# Patient Record
Sex: FEMALE | Race: WHITE | Employment: OTHER | URBAN - METROPOLITAN AREA
[De-identification: names, ages, dates, MRNs, and addresses within clinical notes are randomized per-mention and may not be internally consistent; named-entity substitution may affect disease eponyms.]

---

## 2016-01-21 LAB — PAP SMEAR: NORMAL

## 2017-05-15 ENCOUNTER — TELEPHONE (OUTPATIENT)
Dept: INTERNAL MEDICINE | Age: 62
End: 2017-05-15

## 2017-05-24 ENCOUNTER — HOSPITAL ENCOUNTER (OUTPATIENT)
Dept: MAMMOGRAPHY | Age: 62
Discharge: HOME | End: 2017-05-24
Attending: OBSTETRICS & GYNECOLOGY | Admitting: OBSTETRICS & GYNECOLOGY

## 2017-05-24 DIAGNOSIS — Z12.31 VISIT FOR SCREENING MAMMOGRAM: ICD-10-CM

## 2017-06-21 ENCOUNTER — TELEPHONE (OUTPATIENT)
Dept: INTERNAL MEDICINE | Age: 62
End: 2017-06-21

## 2017-06-21 RX ORDER — SERTRALINE HYDROCHLORIDE 100 MG/1
100 TABLET, FILM COATED ORAL DAILY
Qty: 30 TABLET | Refills: 12 | Status: SHIPPED | OUTPATIENT
Start: 2017-06-21 | End: 2018-07-07 | Stop reason: SDUPTHER

## 2017-07-19 ENCOUNTER — OFFICE VISIT (OUTPATIENT)
Dept: INTERNAL MEDICINE | Age: 62
End: 2017-07-19

## 2017-07-19 VITALS
HEIGHT: 64 IN | HEART RATE: 70 BPM | BODY MASS INDEX: 21.34 KG/M2 | DIASTOLIC BLOOD PRESSURE: 70 MMHG | WEIGHT: 125 LBS | SYSTOLIC BLOOD PRESSURE: 110 MMHG

## 2017-07-19 DIAGNOSIS — G47.419 PRIMARY NARCOLEPSY WITHOUT CATAPLEXY: ICD-10-CM

## 2017-07-19 DIAGNOSIS — Z12.11 SPECIAL SCREENING FOR MALIGNANT NEOPLASMS, COLON: ICD-10-CM

## 2017-07-19 DIAGNOSIS — Z00.00 PERIODIC HEALTH ASSESSMENT, GENERAL SCREENING, ADULT: ICD-10-CM

## 2017-07-19 DIAGNOSIS — E78.5 HYPERLIPIDEMIA, UNSPECIFIED HYPERLIPIDEMIA TYPE: ICD-10-CM

## 2017-07-19 DIAGNOSIS — Z00.00 ROUTINE GENERAL MEDICAL EXAMINATION AT A HEALTH CARE FACILITY: Primary | ICD-10-CM

## 2017-07-19 LAB
BILIRUBIN, POC: NORMAL
BLOOD URINE, POC: NORMAL
CLARITY, POC: CLEAR
COLOR, POC: YELLOW
GLUCOSE URINE, POC: NORMAL
KETONES, POC: NORMAL
LEUKOCYTE EST, POC: NORMAL
NITRITE, POC: NORMAL
PH, POC: 5
PROTEIN, POC: NORMAL
SPECIFIC GRAVITY, POC: 1
UROBILINOGEN, POC: NORMAL

## 2017-07-19 PROCEDURE — 81002 URINALYSIS NONAUTO W/O SCOPE: CPT | Performed by: INTERNAL MEDICINE

## 2017-07-19 PROCEDURE — 99396 PREV VISIT EST AGE 40-64: CPT | Performed by: INTERNAL MEDICINE

## 2017-07-19 PROCEDURE — 93000 ELECTROCARDIOGRAM COMPLETE: CPT | Performed by: INTERNAL MEDICINE

## 2017-07-19 RX ORDER — METHYLPHENIDATE HYDROCHLORIDE 20 MG/1
20 CAPSULE, EXTENDED RELEASE ORAL EVERY MORNING
COMMUNITY

## 2017-07-19 RX ORDER — MODAFINIL 100 MG/1
150 TABLET ORAL DAILY
COMMUNITY

## 2017-07-19 ASSESSMENT — PATIENT HEALTH QUESTIONNAIRE - PHQ9
SUM OF ALL RESPONSES TO PHQ QUESTIONS 1-9: 0
2. FEELING DOWN, DEPRESSED OR HOPELESS: 0
1. LITTLE INTEREST OR PLEASURE IN DOING THINGS: 0
SUM OF ALL RESPONSES TO PHQ9 QUESTIONS 1 & 2: 0

## 2017-07-20 DIAGNOSIS — E78.5 HYPERLIPIDEMIA, UNSPECIFIED HYPERLIPIDEMIA TYPE: ICD-10-CM

## 2017-07-20 DIAGNOSIS — Z00.00 PERIODIC HEALTH ASSESSMENT, GENERAL SCREENING, ADULT: ICD-10-CM

## 2017-07-20 DIAGNOSIS — Z00.00 ROUTINE GENERAL MEDICAL EXAMINATION AT A HEALTH CARE FACILITY: ICD-10-CM

## 2017-07-20 LAB
A/G RATIO: 2.2 (ref 1.1–2.2)
ALBUMIN SERPL-MCNC: 4.6 G/DL (ref 3.4–5)
ALP BLD-CCNC: 78 U/L (ref 40–129)
ALT SERPL-CCNC: 11 U/L (ref 10–40)
ANION GAP SERPL CALCULATED.3IONS-SCNC: 14 MMOL/L (ref 3–16)
AST SERPL-CCNC: 14 U/L (ref 15–37)
BASOPHILS ABSOLUTE: 0 K/UL (ref 0–0.2)
BASOPHILS RELATIVE PERCENT: 0.7 %
BILIRUB SERPL-MCNC: 0.5 MG/DL (ref 0–1)
BUN BLDV-MCNC: 10 MG/DL (ref 7–20)
CALCIUM SERPL-MCNC: 9.4 MG/DL (ref 8.3–10.6)
CHLORIDE BLD-SCNC: 104 MMOL/L (ref 99–110)
CHOLESTEROL, TOTAL: 222 MG/DL (ref 0–199)
CO2: 26 MMOL/L (ref 21–32)
CREAT SERPL-MCNC: 0.6 MG/DL (ref 0.6–1.1)
EOSINOPHILS ABSOLUTE: 0.1 K/UL (ref 0–0.6)
EOSINOPHILS RELATIVE PERCENT: 1.9 %
GFR AFRICAN AMERICAN: >60
GFR NON-AFRICAN AMERICAN: >60
GLOBULIN: 2.1 G/DL
GLUCOSE BLD-MCNC: 85 MG/DL (ref 70–99)
HCT VFR BLD CALC: 41.4 % (ref 36–48)
HDLC SERPL-MCNC: 91 MG/DL (ref 40–60)
HEMOGLOBIN: 13.8 G/DL (ref 12–16)
LDL CHOLESTEROL CALCULATED: 114 MG/DL
LYMPHOCYTES ABSOLUTE: 1.1 K/UL (ref 1–5.1)
LYMPHOCYTES RELATIVE PERCENT: 27.3 %
MCH RBC QN AUTO: 31.4 PG (ref 26–34)
MCHC RBC AUTO-ENTMCNC: 33.4 G/DL (ref 31–36)
MCV RBC AUTO: 94 FL (ref 80–100)
MONOCYTES ABSOLUTE: 0.3 K/UL (ref 0–1.3)
MONOCYTES RELATIVE PERCENT: 8.5 %
NEUTROPHILS ABSOLUTE: 2.5 K/UL (ref 1.7–7.7)
NEUTROPHILS RELATIVE PERCENT: 61.6 %
PDW BLD-RTO: 13.2 % (ref 12.4–15.4)
PLATELET # BLD: 172 K/UL (ref 135–450)
PMV BLD AUTO: 8.1 FL (ref 5–10.5)
POTASSIUM SERPL-SCNC: 4.4 MMOL/L (ref 3.5–5.1)
RBC # BLD: 4.4 M/UL (ref 4–5.2)
SODIUM BLD-SCNC: 144 MMOL/L (ref 136–145)
TOTAL PROTEIN: 6.7 G/DL (ref 6.4–8.2)
TRIGL SERPL-MCNC: 85 MG/DL (ref 0–150)
TSH SERPL DL<=0.05 MIU/L-ACNC: 1.95 UIU/ML (ref 0.27–4.2)
VLDLC SERPL CALC-MCNC: 17 MG/DL
WBC # BLD: 4 K/UL (ref 4–11)

## 2017-07-21 LAB — HIV-1 AND HIV-2 ANTIBODIES: NORMAL

## 2018-03-08 ENCOUNTER — TELEPHONE (OUTPATIENT)
Dept: ORTHOPEDIC SURGERY | Age: 63
End: 2018-03-08

## 2018-03-15 ENCOUNTER — HOSPITAL ENCOUNTER (OUTPATIENT)
Dept: OCCUPATIONAL THERAPY | Age: 63
Discharge: HOME | End: 2018-03-31
Attending: ORTHOPAEDIC SURGERY | Admitting: ORTHOPAEDIC SURGERY

## 2018-03-15 ENCOUNTER — OFFICE VISIT (OUTPATIENT)
Dept: ORTHOPEDIC SURGERY | Age: 63
End: 2018-03-15

## 2018-03-15 VITALS — WEIGHT: 120 LBS | BODY MASS INDEX: 19.99 KG/M2 | HEIGHT: 65 IN

## 2018-03-15 DIAGNOSIS — M79.642 LEFT HAND PAIN: ICD-10-CM

## 2018-03-15 DIAGNOSIS — M79.641 RIGHT HAND PAIN: ICD-10-CM

## 2018-03-15 DIAGNOSIS — M18.0 ARTHRITIS OF CARPOMETACARPAL (CMC) JOINTS OF BOTH THUMBS: Primary | ICD-10-CM

## 2018-03-15 PROCEDURE — 99214 OFFICE O/P EST MOD 30 MIN: CPT | Performed by: ORTHOPAEDIC SURGERY

## 2018-03-15 NOTE — PROGRESS NOTES
HISTORY OF PRESENT ILLNESS: I'm not seen the patient a couple of years. She reports that over the last year or so she has noticed increasing base of the thumb pain on both right and left thumbs. When I saw her in 2016 she had a combination of carpal bossing and medial epicondylitis which have resolved. She does recall even at that time we had some attention paid to the thumb, but at that point he was really only on the right side. Now with gripping and lifting and pinching she has pain. She denies any numbness or tingling. PAST MEDICAL HISTORY: Patient's medications, allergies, past medical, surgical, social and family histories were reviewed and updated as appropriate. ROS: Pertinent items are noted in HPI. Review of systems reviewed from patient history form dated on 3/15/18 and available in the patient's chart under the media tab. PHYSICAL EXAMINATION: Examination reveals a pleasant individual in no acute distress. There appears to be satisfactory pain-free range of motion, strength, and stability of the cervical spine, shoulders, elbows, and wrists. Skin is intact without lymphadenopathy, discoloration, or abnormal temperature. There is intact, symmetric circulation in both upper extremities. Tenderness is elicited with CMC grind of the right slightly more so than left  thumb with pain on firm pressure over the trapeziometacarpal joint. Satisfactory stability exists at the MP joint. DIAGNOSTIC TESTING: Three views of the right and also left thumb grade 3 reveal degenerative change at each [facility] joint without obvious acute fracture. IMPRESSION AND PLAN: Degenerative arthritis of the right and also left thumb. We discussed this common entity and appropriate conservative and surgical options. Appropriate initial steps include activity modification, rest, splinting, hand therapy, and injection. I also recommend utilizing  modifiers that decrease thumb pinch stress.  Surgical intervention can usually be reserved for longstanding recalcitrant cases. Today we will demonstrate neoprene thumb spica supportive wraps for each thumb, but ultimately she decided to hold off on purchasing them. I will also give her a new dedicated therapy referral to concentrate on conservative plan for the thumb arthritis. Down the road if she fails to have lasting relief a cortisone injection can always be considered. Appropriate followup plans are discussed with the patient depending on the level of progress with the conservative care. No orders of the defined types were placed in this encounter.

## 2018-03-19 ENCOUNTER — HOSPITAL ENCOUNTER (OUTPATIENT)
Dept: OCCUPATIONAL THERAPY | Age: 63
Discharge: HOME | End: 2018-03-20
Admitting: ORTHOPAEDIC SURGERY

## 2018-03-20 NOTE — PLAN OF CARE
[facility], [address] [place]  Phone: [phone]  Fax [phone]    Patient: Sunitha Guzman   : 1955  MRN: [id number]  Referring Physician:  Berkley Mccoy    Evaluation Date: 3/20/2018      Medical Diagnosis Information:   B CMC OA          Medical/Treatment Diagnosis Information:  ·  B thumb CMC OA B thumb pain M79.641 S83.628  Insurance/Certification information:   [facility]   Physician Information:    Dr. Berkley Mccoy       Next MD Appointment:     Subjective  Pt reports having difficulty tolerating CMC splints especially on R hand due to discomfort  History of Injury/ Mechanism of Injury: progressive onset   Onset/Surgery Date: years ago  Dominant Hand:    [x] Right []Left  Occupational/Vocational Status: [employer]   Progress of any previous OT/PT: the patient []has/ [x]has not received OT/PT previously for this diagnosis. Pain: 6/10 R 5/10 L     Objective Findings as appropriate:  ROM, strength, edema, wound/ scar appearance, function:    Type of splint:   Hand based thumb spica x 2  Splint protocol utilization:   Full time   Splint Purpose: [x]Immobilize or protect [x]Promote healing of    [x]Relieve pain  [x]Provide support for improved hand function []Maximize joint motion    Treatment: Fabricated new CMC brace with an alternative design with hopes that it would feel more comfortable for Pt. Plan:  [x]Splint completed with good fit and function. Hand Therapy to follow up for     splint modifications as needed    []Splint completed; OT/PT evaluation initiated. Patient to return for further     treatment.     Marita Lazaro, OT/L [id number]

## 2018-04-01 ENCOUNTER — HOSPITAL ENCOUNTER (OUTPATIENT)
Dept: OCCUPATIONAL THERAPY | Age: 63
Discharge: HOME | End: 2018-04-30
Attending: ORTHOPAEDIC SURGERY | Admitting: ORTHOPAEDIC SURGERY

## 2018-04-09 ENCOUNTER — HOSPITAL ENCOUNTER (OUTPATIENT)
Dept: OCCUPATIONAL THERAPY | Age: 63
Discharge: HOME | End: 2018-04-10
Admitting: ORTHOPAEDIC SURGERY

## 2018-04-09 ENCOUNTER — OFFICE VISIT (OUTPATIENT)
Dept: ORTHOPEDIC SURGERY | Age: 63
End: 2018-04-09

## 2018-04-09 VITALS — BODY MASS INDEX: 20.47 KG/M2 | HEIGHT: 64 IN | WEIGHT: 119.93 LBS

## 2018-04-09 DIAGNOSIS — M79.641 PAIN OF RIGHT HAND: ICD-10-CM

## 2018-04-09 DIAGNOSIS — M25.40 PAINFUL SWELLING OF JOINT: Primary | ICD-10-CM

## 2018-04-09 DIAGNOSIS — M18.0 ARTHRITIS OF CARPOMETACARPAL (CMC) JOINTS OF BOTH THUMBS: ICD-10-CM

## 2018-04-09 DIAGNOSIS — M79.642 PAIN OF LEFT HAND: ICD-10-CM

## 2018-04-09 DIAGNOSIS — M65.9 SYNOVITIS OF HAND: ICD-10-CM

## 2018-04-09 PROCEDURE — 20600 DRAIN/INJ JOINT/BURSA W/O US: CPT | Performed by: ORTHOPAEDIC SURGERY

## 2018-04-09 PROCEDURE — 99214 OFFICE O/P EST MOD 30 MIN: CPT | Performed by: ORTHOPAEDIC SURGERY

## 2018-04-10 DIAGNOSIS — M79.642 PAIN OF LEFT HAND: ICD-10-CM

## 2018-04-10 DIAGNOSIS — M79.641 PAIN OF RIGHT HAND: ICD-10-CM

## 2018-04-10 DIAGNOSIS — M25.40 PAINFUL SWELLING OF JOINT: ICD-10-CM

## 2018-04-10 LAB
A/G RATIO: 2.2 (ref 1.1–2.2)
ALBUMIN SERPL-MCNC: 5.2 G/DL (ref 3.4–5)
ALP BLD-CCNC: 83 U/L (ref 40–129)
ALT SERPL-CCNC: 13 U/L (ref 10–40)
ANION GAP SERPL CALCULATED.3IONS-SCNC: 15 MMOL/L (ref 3–16)
AST SERPL-CCNC: 15 U/L (ref 15–37)
BASOPHILS ABSOLUTE: 0 K/UL (ref 0–0.2)
BASOPHILS RELATIVE PERCENT: 0.2 %
BILIRUB SERPL-MCNC: 0.3 MG/DL (ref 0–1)
BUN BLDV-MCNC: 15 MG/DL (ref 7–20)
C-REACTIVE PROTEIN: 1 MG/L (ref 0–5.1)
CALCIUM SERPL-MCNC: 9.9 MG/DL (ref 8.3–10.6)
CHLORIDE BLD-SCNC: 94 MMOL/L (ref 99–110)
CO2: 25 MMOL/L (ref 21–32)
CREAT SERPL-MCNC: 0.5 MG/DL (ref 0.6–1.2)
EOSINOPHILS ABSOLUTE: 0 K/UL (ref 0–0.6)
EOSINOPHILS RELATIVE PERCENT: 0.2 %
GFR AFRICAN AMERICAN: >60
GFR NON-AFRICAN AMERICAN: >60
GLOBULIN: 2.4 G/DL
GLUCOSE BLD-MCNC: 88 MG/DL (ref 70–99)
HCT VFR BLD CALC: 40.4 % (ref 36–48)
HEMOGLOBIN: 13.8 G/DL (ref 12–16)
LYMPHOCYTES ABSOLUTE: 1.6 K/UL (ref 1–5.1)
LYMPHOCYTES RELATIVE PERCENT: 17.5 %
MCH RBC QN AUTO: 31.6 PG (ref 26–34)
MCHC RBC AUTO-ENTMCNC: 34.1 G/DL (ref 31–36)
MCV RBC AUTO: 92.6 FL (ref 80–100)
MONOCYTES ABSOLUTE: 0.5 K/UL (ref 0–1.3)
MONOCYTES RELATIVE PERCENT: 5.7 %
NEUTROPHILS ABSOLUTE: 6.8 K/UL (ref 1.7–7.7)
NEUTROPHILS RELATIVE PERCENT: 76.4 %
PDW BLD-RTO: 12.8 % (ref 12.4–15.4)
PLATELET # BLD: 225 K/UL (ref 135–450)
PMV BLD AUTO: 8.2 FL (ref 5–10.5)
POTASSIUM SERPL-SCNC: 4.3 MMOL/L (ref 3.5–5.1)
RBC # BLD: 4.36 M/UL (ref 4–5.2)
RHEUMATOID FACTOR: <10 IU/ML
SEDIMENTATION RATE, ERYTHROCYTE: 9 MM/HR (ref 0–30)
SODIUM BLD-SCNC: 134 MMOL/L (ref 136–145)
TOTAL PROTEIN: 7.6 G/DL (ref 6.4–8.2)
URIC ACID, SERUM: 2.3 MG/DL (ref 2.6–6)
WBC # BLD: 8.9 K/UL (ref 4–11)

## 2018-04-11 ENCOUNTER — OFFICE VISIT (OUTPATIENT)
Dept: RHEUMATOLOGY | Age: 63
End: 2018-04-11

## 2018-04-11 VITALS
BODY MASS INDEX: 21.68 KG/M2 | DIASTOLIC BLOOD PRESSURE: 76 MMHG | WEIGHT: 127 LBS | HEIGHT: 64 IN | TEMPERATURE: 98.4 F | SYSTOLIC BLOOD PRESSURE: 118 MMHG | HEART RATE: 72 BPM

## 2018-04-11 DIAGNOSIS — I73.00 RAYNAUD'S PHENOMENON WITHOUT GANGRENE: ICD-10-CM

## 2018-04-11 DIAGNOSIS — M18.0 ARTHRITIS OF CARPOMETACARPAL (CMC) JOINTS OF BOTH THUMBS: Primary | ICD-10-CM

## 2018-04-11 DIAGNOSIS — M19.049 LOCALIZED OSTEOARTHRITIS OF HAND, UNSPECIFIED LATERALITY: Primary | ICD-10-CM

## 2018-04-11 LAB
ANA INTERPRETATION: NORMAL
ANTI-NUCLEAR ANTIBODY (ANA): NEGATIVE

## 2018-04-11 PROCEDURE — 99244 OFF/OP CNSLTJ NEW/EST MOD 40: CPT | Performed by: INTERNAL MEDICINE

## 2018-04-11 RX ORDER — DICLOFENAC SODIUM 75 MG/1
75 TABLET, DELAYED RELEASE ORAL 2 TIMES DAILY
Qty: 60 TABLET | Refills: 2 | Status: SHIPPED | OUTPATIENT
Start: 2018-04-11 | End: 2019-03-01 | Stop reason: CLARIF

## 2018-04-13 LAB — SCLERODERMA (SCL-70) AB: 3 AU/ML (ref 0–40)

## 2018-04-16 LAB — CRYOGLOBULIN, QUALITATIVE: NORMAL

## 2018-05-01 ENCOUNTER — HOSPITAL ENCOUNTER (OUTPATIENT)
Dept: OCCUPATIONAL THERAPY | Age: 63
Discharge: HOME | End: 2018-05-31
Attending: ORTHOPAEDIC SURGERY | Admitting: ORTHOPAEDIC SURGERY

## 2018-05-25 ENCOUNTER — HOSPITAL ENCOUNTER (OUTPATIENT)
Dept: MAMMOGRAPHY | Age: 63
Discharge: HOME | End: 2018-05-25
Attending: OBSTETRICS & GYNECOLOGY | Admitting: OBSTETRICS & GYNECOLOGY

## 2018-05-25 DIAGNOSIS — Z12.31 VISIT FOR SCREENING MAMMOGRAM: ICD-10-CM

## 2018-06-07 LAB — PAP SMEAR: NORMAL

## 2018-06-14 ENCOUNTER — OFFICE VISIT (OUTPATIENT)
Dept: ORTHOPEDIC SURGERY | Age: 63
End: 2018-06-14

## 2018-06-14 ENCOUNTER — HOSPITAL ENCOUNTER (OUTPATIENT)
Dept: OCCUPATIONAL THERAPY | Age: 63
Discharge: HOME | End: 2018-06-30

## 2018-06-14 DIAGNOSIS — M18.0 ARTHRITIS OF CARPOMETACARPAL (CMC) JOINTS OF BOTH THUMBS: Primary | ICD-10-CM

## 2018-06-14 PROCEDURE — 20600 DRAIN/INJ JOINT/BURSA W/O US: CPT | Performed by: ORTHOPAEDIC SURGERY

## 2018-06-14 PROCEDURE — 99214 OFFICE O/P EST MOD 30 MIN: CPT | Performed by: ORTHOPAEDIC SURGERY

## 2018-07-01 ENCOUNTER — HOSPITAL ENCOUNTER (OUTPATIENT)
Dept: PHYSICAL THERAPY | Age: 63
Discharge: HOME | End: 2018-07-01

## 2018-07-07 RX ORDER — SERTRALINE HYDROCHLORIDE 100 MG/1
TABLET, FILM COATED ORAL
Qty: 30 TABLET | Refills: 11 | Status: SHIPPED | OUTPATIENT
Start: 2018-07-07 | End: 2019-06-15 | Stop reason: SDUPTHER

## 2018-07-17 ENCOUNTER — TELEPHONE (OUTPATIENT)
Dept: ORTHOPEDIC SURGERY | Age: 63
End: 2018-07-17

## 2018-07-17 NOTE — TELEPHONE ENCOUNTER
Auth: NPR  Date: 8/6/18  Reference # None  Type of SX: Outpatient  Location: [address] [address], [id number], [id number]   SX area: Rt thumb

## 2018-07-20 PROBLEM — G47.419 NARCOLEPSY: Status: RESOLVED | Noted: 2017-07-19 | Resolved: 2018-07-20

## 2018-07-20 PROBLEM — M65.9 SYNOVITIS OF HAND: Status: RESOLVED | Noted: 2018-04-09 | Resolved: 2018-07-20

## 2018-07-20 PROBLEM — M18.0 ARTHRITIS OF CARPOMETACARPAL (CMC) JOINTS OF BOTH THUMBS: Status: RESOLVED | Noted: 2018-03-15 | Resolved: 2018-07-20

## 2018-07-26 ENCOUNTER — OFFICE VISIT (OUTPATIENT)
Dept: INTERNAL MEDICINE | Age: 63
End: 2018-07-26

## 2018-07-26 VITALS
WEIGHT: 124 LBS | HEART RATE: 70 BPM | RESPIRATION RATE: 12 BRPM | HEIGHT: 64 IN | SYSTOLIC BLOOD PRESSURE: 120 MMHG | DIASTOLIC BLOOD PRESSURE: 80 MMHG | BODY MASS INDEX: 21.17 KG/M2

## 2018-07-26 DIAGNOSIS — Z00.00 ROUTINE GENERAL MEDICAL EXAMINATION AT A HEALTH CARE FACILITY: Primary | ICD-10-CM

## 2018-07-26 DIAGNOSIS — G47.419 PRIMARY NARCOLEPSY WITHOUT CATAPLEXY: ICD-10-CM

## 2018-07-26 DIAGNOSIS — E78.5 HYPERLIPIDEMIA, UNSPECIFIED HYPERLIPIDEMIA TYPE: ICD-10-CM

## 2018-07-26 PROCEDURE — 99396 PREV VISIT EST AGE 40-64: CPT | Performed by: INTERNAL MEDICINE

## 2018-07-26 PROCEDURE — 93000 ELECTROCARDIOGRAM COMPLETE: CPT | Performed by: INTERNAL MEDICINE

## 2018-07-26 PROCEDURE — 81002 URINALYSIS NONAUTO W/O SCOPE: CPT | Performed by: INTERNAL MEDICINE

## 2018-07-26 ASSESSMENT — PATIENT HEALTH QUESTIONNAIRE - PHQ9
2. FEELING DOWN, DEPRESSED OR HOPELESS: 0
SUM OF ALL RESPONSES TO PHQ QUESTIONS 1-9: 0
1. LITTLE INTEREST OR PLEASURE IN DOING THINGS: 0
SUM OF ALL RESPONSES TO PHQ9 QUESTIONS 1 & 2: 0

## 2018-07-26 NOTE — PROGRESS NOTES
Coretta Hannah 63 y.o. presents today with   Chief Complaint   Patient presents with    Annual Exam       Family History   Problem Relation Age of Onset    Hypertension Father 80        Alive, hypertension.  Other Mother 80        Alive, in good health. Social History     Social History    Marital status:      Spouse name: Raquel Morrell Number of children: 3    Years of education: N/A     Occupational History    She is a teacher. Social History Main Topics    Smoking status: Former Smoker     Packs/day: 1.00     Years: 2.00     Types: Cigarettes     Quit date: 1/1/1998    Smokeless tobacco: Never Used    Alcohol use Not on file    Drug use: Unknown    Sexual activity: Not on file     Other Topics Concern    Not on file     Social History Narrative    Living Will:  No.               Patient Active Problem List   Diagnosis    Hyperlipidemia    Narcolepsy       Past Medical History:   Diagnosis Date    Cholelithiases May, 2013    By ultrasound    Encounter for cervical Pap smear with pelvic exam *June 6, 2018    Dr. Jina Perez    Hyperlipidemia     Meniere's disease     Migraine     Narcolepsy *2016    Dr. Bret Ramirez     Osteopenia DEXA-July, 2012    Lumbar T score +0.2 and Hip T score 0.0    Other screening mammogram 2010    Per Dr. Jina Perez.      Other screening mammogram January 19, 2012    Negative    Other screening mammogram May 31, 2013    Negative    Other screening mammogram May 29, 2014    Negative    Other screening mammogram *May 24, 2017    Benign ( Dr. Jina Perez )    Screening mammogram, encounter for *May 25, 2018    Benign ( Dr. Jina Perez )        Past Surgical History:   Procedure Laterality Date    COLONOSCOPY  May, 2001 ( 2011 )    Dr. Jocelyn Choi - lele.    COLONOSCOPY  Nov., 2012 ( 2022 )     - Lele    NASAL SEPTUM SURGERY  1983    OTHER SURGICAL HISTORY  *July, 2017    Dr. Stanford Carrel - excision of a mcous retention cyst    TOE SURGERY  Nov.,

## 2018-07-27 DIAGNOSIS — Z00.00 ROUTINE GENERAL MEDICAL EXAMINATION AT A HEALTH CARE FACILITY: ICD-10-CM

## 2018-07-27 LAB
CHOLESTEROL, TOTAL: 233 MG/DL (ref 0–199)
HDLC SERPL-MCNC: 84 MG/DL (ref 40–60)
LDL CHOLESTEROL CALCULATED: 133 MG/DL
TRIGL SERPL-MCNC: 80 MG/DL (ref 0–150)
TSH SERPL DL<=0.05 MIU/L-ACNC: 1.68 UIU/ML (ref 0.27–4.2)
VLDLC SERPL CALC-MCNC: 16 MG/DL

## 2018-07-30 NOTE — PROGRESS NOTES
Obstructive Sleep Apnea (LATRICE) Screening     Patient:  Stuart Nguyen    YOB: 1955      Medical Record #:  [id number]                     Date:  7/30/2018     1. Are you a loud and/or regular snorer? [x]  Yes       [] No    2. Have you been observed to gasp or stop breathing during sleep? []  Yes       [x] No    3. Do you feel tired or groggy upon awakening or do you awaken with a headache? [x]  Yes       [] No    4. Are you often tired or fatigued during the wake time hours? [x]  Yes       [] No    5. Do you fall asleep sitting, reading, watching TV or driving? [x]  Yes       [] No    6. Do you often have problems with memory or concentration? []  Yes       [x] No    **If patient's score is ? 3 they are considered high risk for LATRICE. Notify the anesthesiologist of the high risk and document in focus note. Note:  If the patient's BMI is more than 35 kg m¯² , has neck circumference > 40 cm, and/or high blood pressure the risk is greater (© American Sleep Apnea Association, 2006).

## 2018-08-05 NOTE — OP NOTE
[facility]  Procedure Note  [facility] Jacy Covert  08/06/2018    Pre-operative Diagnosis:Right and Left Thumb Carpometacarpal Arthritis    Post-operative Diagnosis: same    Procedure:    1. Right Thumb Carpometacarpal Joint Arthroplasty   2. Right Flexor Carpi Radialis tendon interposition transfer to [facility] joint   3. Intraoperative interpretation 3 views of the Right hand   4. Injection of cortisone left thumb Carpometacarpal Joint    Surgeon: Maurisio SANDOVAL    Anesthesia:  General/Regional    Complications:  None    INDICATIONS FOR PROCEDURE: The patient has degenerative arthritis of the [facility] joint of the thumb and has failed appropriate conservative care. The patient understands the relevant risks, benefits, limitations, alternatives, and healing process of the procedure. PROCEDURE: The patient was given IV antibiotics, a regional block, and brought to the operating room and anesthetized with general anesthesia. Prior to prepping, the left thumb was injected with 1% lidocaine and 1cc dexamethasone at the left thumb CMC joint. The identified and marked right extremity was then prepped and draped in a standard fashion. A well-padded tourniquet was applied to the right upper arm. The arm was exsanguinated and the tourniquet elevated to 250 mmHg. A standard curvilinear incision was made over the [facility] joint of the right thumb. Dissection carried through skin, subcutaneous tissue, and careful attention was paid to protect cutaneous nerve branches as possible. The [facility] joint was identified, and the trapezium was split in half with an osteotome. Both halves of the trapezium were excised with care undertaken to optimally protect the FCR at the base of the trapezial space. Any residual loose bodies or prominent spurs were also removed and contoured. Three separate volar incisions were made over the forearm to harvest the FCR tendon.   It was advanced to the

## 2018-08-06 ENCOUNTER — ANESTHESIA (OUTPATIENT)
Dept: OPERATING ROOM | Age: 63
End: 2018-08-06
Payer: COMMERCIAL

## 2018-08-06 ENCOUNTER — ANESTHESIA EVENT (OUTPATIENT)
Dept: OPERATING ROOM | Age: 63
End: 2018-08-06
Payer: COMMERCIAL

## 2018-08-06 ENCOUNTER — HOSPITAL ENCOUNTER (OUTPATIENT)
Age: 63
Setting detail: OUTPATIENT SURGERY
Discharge: HOME | End: 2018-08-06
Attending: ORTHOPAEDIC SURGERY | Admitting: ORTHOPAEDIC SURGERY
Payer: COMMERCIAL

## 2018-08-06 VITALS
OXYGEN SATURATION: 96 % | RESPIRATION RATE: 9 BRPM | HEART RATE: 70 BPM | DIASTOLIC BLOOD PRESSURE: 71 MMHG | BODY MASS INDEX: 21.17 KG/M2 | TEMPERATURE: 97.7 F | HEIGHT: 64 IN | SYSTOLIC BLOOD PRESSURE: 117 MMHG | WEIGHT: 124 LBS

## 2018-08-06 VITALS
DIASTOLIC BLOOD PRESSURE: 52 MMHG | TEMPERATURE: 98.6 F | RESPIRATION RATE: 13 BRPM | SYSTOLIC BLOOD PRESSURE: 93 MMHG | OXYGEN SATURATION: 94 %

## 2018-08-06 DIAGNOSIS — M18.0 PRIMARY ARTHROSIS OF FIRST CARPOMETACARPAL JOINTS, BILATERAL: Primary | ICD-10-CM

## 2018-08-06 PROCEDURE — 6360000002 HC RX W HCPCS: Performed by: ORTHOPAEDIC SURGERY

## 2018-08-06 PROCEDURE — 6360000002 HC RX W HCPCS: Performed by: NURSE ANESTHETIST, CERTIFIED REGISTERED

## 2018-08-06 PROCEDURE — 3700000001 HC ADD 15 MINUTES (ANESTHESIA): Performed by: ORTHOPAEDIC SURGERY

## 2018-08-06 PROCEDURE — 3600000014 HC SURGERY LEVEL 4 ADDTL 15MIN: Performed by: ORTHOPAEDIC SURGERY

## 2018-08-06 PROCEDURE — 2500000003 HC RX 250 WO HCPCS: Performed by: NURSE ANESTHETIST, CERTIFIED REGISTERED

## 2018-08-06 PROCEDURE — C1713 ANCHOR/SCREW BN/BN,TIS/BN: HCPCS | Performed by: ORTHOPAEDIC SURGERY

## 2018-08-06 PROCEDURE — 64415 NJX AA&/STRD BRCH PLXS IMG: CPT | Performed by: ANESTHESIOLOGY

## 2018-08-06 PROCEDURE — 3700000000 HC ANESTHESIA ATTENDED CARE: Performed by: ORTHOPAEDIC SURGERY

## 2018-08-06 PROCEDURE — 2580000003 HC RX 258: Performed by: ANESTHESIOLOGY

## 2018-08-06 PROCEDURE — 2709999900 HC NON-CHARGEABLE SUPPLY: Performed by: ORTHOPAEDIC SURGERY

## 2018-08-06 PROCEDURE — 7100000011 HC PHASE II RECOVERY - ADDTL 15 MIN: Performed by: ORTHOPAEDIC SURGERY

## 2018-08-06 PROCEDURE — 2580000003 HC RX 258: Performed by: ORTHOPAEDIC SURGERY

## 2018-08-06 PROCEDURE — 3600000004 HC SURGERY LEVEL 4 BASE: Performed by: ORTHOPAEDIC SURGERY

## 2018-08-06 PROCEDURE — 7100000010 HC PHASE II RECOVERY - FIRST 15 MIN: Performed by: ORTHOPAEDIC SURGERY

## 2018-08-06 PROCEDURE — 7100000000 HC PACU RECOVERY - FIRST 15 MIN: Performed by: ORTHOPAEDIC SURGERY

## 2018-08-06 PROCEDURE — 6360000002 HC RX W HCPCS: Performed by: ANESTHESIOLOGY

## 2018-08-06 PROCEDURE — 2500000003 HC RX 250 WO HCPCS: Performed by: ORTHOPAEDIC SURGERY

## 2018-08-06 DEVICE — ANCHOR SUT SZ 2-0 ETHBND V5 BIT QUICKANCHR + MINILOK: Type: IMPLANTABLE DEVICE | Site: THUMB | Status: FUNCTIONAL

## 2018-08-06 RX ORDER — FENTANYL CITRATE 50 UG/ML
INJECTION, SOLUTION INTRAMUSCULAR; INTRAVENOUS PRN
Status: DISCONTINUED | OUTPATIENT
Start: 2018-08-06 | End: 2018-08-06 | Stop reason: SDUPTHER

## 2018-08-06 RX ORDER — FENTANYL CITRATE 50 UG/ML
25 INJECTION, SOLUTION INTRAMUSCULAR; INTRAVENOUS EVERY 5 MIN PRN
Status: DISCONTINUED | OUTPATIENT
Start: 2018-08-06 | End: 2018-08-06 | Stop reason: HOSPADM

## 2018-08-06 RX ORDER — LIDOCAINE HYDROCHLORIDE 10 MG/ML
INJECTION, SOLUTION INFILTRATION; PERINEURAL PRN
Status: DISCONTINUED | OUTPATIENT
Start: 2018-08-06 | End: 2018-08-06 | Stop reason: HOSPADM

## 2018-08-06 RX ORDER — DEXAMETHASONE SODIUM PHOSPHATE 10 MG/ML
INJECTION INTRAMUSCULAR; INTRAVENOUS PRN
Status: DISCONTINUED | OUTPATIENT
Start: 2018-08-06 | End: 2018-08-06 | Stop reason: SDUPTHER

## 2018-08-06 RX ORDER — LIDOCAINE HYDROCHLORIDE 10 MG/ML
INJECTION, SOLUTION INFILTRATION; PERINEURAL PRN
Status: DISCONTINUED | OUTPATIENT
Start: 2018-08-06 | End: 2018-08-06 | Stop reason: SDUPTHER

## 2018-08-06 RX ORDER — ONDANSETRON 2 MG/ML
INJECTION INTRAMUSCULAR; INTRAVENOUS PRN
Status: DISCONTINUED | OUTPATIENT
Start: 2018-08-06 | End: 2018-08-06 | Stop reason: SDUPTHER

## 2018-08-06 RX ORDER — MIDAZOLAM HYDROCHLORIDE 1 MG/ML
INJECTION INTRAMUSCULAR; INTRAVENOUS PRN
Status: DISCONTINUED | OUTPATIENT
Start: 2018-08-06 | End: 2018-08-06 | Stop reason: SDUPTHER

## 2018-08-06 RX ORDER — SODIUM CHLORIDE, SODIUM LACTATE, POTASSIUM CHLORIDE, CALCIUM CHLORIDE 600; 310; 30; 20 MG/100ML; MG/100ML; MG/100ML; MG/100ML
INJECTION, SOLUTION INTRAVENOUS CONTINUOUS
Status: DISCONTINUED | OUTPATIENT
Start: 2018-08-06 | End: 2018-08-06 | Stop reason: HOSPADM

## 2018-08-06 RX ORDER — LIDOCAINE HYDROCHLORIDE 10 MG/ML
INJECTION, SOLUTION INFILTRATION; PERINEURAL
Status: DISCONTINUED
Start: 2018-08-06 | End: 2018-08-06 | Stop reason: HOSPADM

## 2018-08-06 RX ORDER — DIPHENHYDRAMINE HYDROCHLORIDE 50 MG/ML
12.5 INJECTION INTRAMUSCULAR; INTRAVENOUS
Status: DISCONTINUED | OUTPATIENT
Start: 2018-08-06 | End: 2018-08-06 | Stop reason: HOSPADM

## 2018-08-06 RX ORDER — MEPERIDINE HYDROCHLORIDE 50 MG/ML
12.5 INJECTION INTRAMUSCULAR; INTRAVENOUS; SUBCUTANEOUS EVERY 5 MIN PRN
Status: DISCONTINUED | OUTPATIENT
Start: 2018-08-06 | End: 2018-08-06 | Stop reason: HOSPADM

## 2018-08-06 RX ORDER — GLYCOPYRROLATE 0.2 MG/ML
INJECTION INTRAMUSCULAR; INTRAVENOUS PRN
Status: DISCONTINUED | OUTPATIENT
Start: 2018-08-06 | End: 2018-08-06 | Stop reason: SDUPTHER

## 2018-08-06 RX ORDER — PROMETHAZINE HYDROCHLORIDE 25 MG/ML
6.25 INJECTION, SOLUTION INTRAMUSCULAR; INTRAVENOUS
Status: DISCONTINUED | OUTPATIENT
Start: 2018-08-06 | End: 2018-08-06 | Stop reason: HOSPADM

## 2018-08-06 RX ORDER — MAGNESIUM HYDROXIDE 1200 MG/15ML
LIQUID ORAL CONTINUOUS PRN
Status: DISCONTINUED | OUTPATIENT
Start: 2018-08-06 | End: 2018-08-06 | Stop reason: HOSPADM

## 2018-08-06 RX ORDER — FENTANYL CITRATE 50 UG/ML
INJECTION, SOLUTION INTRAMUSCULAR; INTRAVENOUS
Status: COMPLETED
Start: 2018-08-06 | End: 2018-08-06

## 2018-08-06 RX ORDER — LABETALOL HYDROCHLORIDE 5 MG/ML
5 INJECTION, SOLUTION INTRAVENOUS EVERY 10 MIN PRN
Status: DISCONTINUED | OUTPATIENT
Start: 2018-08-06 | End: 2018-08-06 | Stop reason: HOSPADM

## 2018-08-06 RX ORDER — PROPOFOL 10 MG/ML
INJECTION, EMULSION INTRAVENOUS PRN
Status: DISCONTINUED | OUTPATIENT
Start: 2018-08-06 | End: 2018-08-06 | Stop reason: SDUPTHER

## 2018-08-06 RX ORDER — ONDANSETRON 2 MG/ML
4 INJECTION INTRAMUSCULAR; INTRAVENOUS
Status: DISCONTINUED | OUTPATIENT
Start: 2018-08-06 | End: 2018-08-06 | Stop reason: HOSPADM

## 2018-08-06 RX ORDER — HYDRALAZINE HYDROCHLORIDE 20 MG/ML
5 INJECTION INTRAMUSCULAR; INTRAVENOUS EVERY 10 MIN PRN
Status: DISCONTINUED | OUTPATIENT
Start: 2018-08-06 | End: 2018-08-06 | Stop reason: HOSPADM

## 2018-08-06 RX ORDER — ONDANSETRON 4 MG/1
4 TABLET, FILM COATED ORAL EVERY 8 HOURS PRN
Qty: 10 TABLET | Refills: 1 | Status: SHIPPED | OUTPATIENT
Start: 2018-08-06 | End: 2019-03-01 | Stop reason: CLARIF

## 2018-08-06 RX ORDER — OXYCODONE HYDROCHLORIDE AND ACETAMINOPHEN 5; 325 MG/1; MG/1
1 TABLET ORAL ONCE
Status: DISCONTINUED | OUTPATIENT
Start: 2018-08-06 | End: 2018-08-06 | Stop reason: HOSPADM

## 2018-08-06 RX ORDER — OXYCODONE HYDROCHLORIDE AND ACETAMINOPHEN 5; 325 MG/1; MG/1
1 TABLET ORAL EVERY 6 HOURS PRN
Qty: 28 TABLET | Refills: 0 | Status: SHIPPED | OUTPATIENT
Start: 2018-08-06 | End: 2018-08-13

## 2018-08-06 RX ORDER — DEXAMETHASONE SODIUM PHOSPHATE 4 MG/ML
INJECTION, SOLUTION INTRA-ARTICULAR; INTRALESIONAL; INTRAMUSCULAR; INTRAVENOUS; SOFT TISSUE PRN
Status: DISCONTINUED | OUTPATIENT
Start: 2018-08-06 | End: 2018-08-06 | Stop reason: HOSPADM

## 2018-08-06 RX ORDER — MIDAZOLAM HYDROCHLORIDE 1 MG/ML
INJECTION INTRAMUSCULAR; INTRAVENOUS
Status: COMPLETED
Start: 2018-08-06 | End: 2018-08-06

## 2018-08-06 RX ORDER — METOCLOPRAMIDE HYDROCHLORIDE 5 MG/ML
INJECTION INTRAMUSCULAR; INTRAVENOUS PRN
Status: DISCONTINUED | OUTPATIENT
Start: 2018-08-06 | End: 2018-08-06 | Stop reason: SDUPTHER

## 2018-08-06 RX ADMIN — SODIUM CHLORIDE, POTASSIUM CHLORIDE, SODIUM LACTATE AND CALCIUM CHLORIDE: 600; 310; 30; 20 INJECTION, SOLUTION INTRAVENOUS at 06:48

## 2018-08-06 RX ADMIN — ONDANSETRON 4 MG: 2 INJECTION INTRAMUSCULAR; INTRAVENOUS at 07:44

## 2018-08-06 RX ADMIN — LIDOCAINE HYDROCHLORIDE 40 MG: 10 INJECTION, SOLUTION INFILTRATION; PERINEURAL at 07:36

## 2018-08-06 RX ADMIN — DEXAMETHASONE SODIUM PHOSPHATE 5 MG: 10 INJECTION INTRAMUSCULAR; INTRAVENOUS at 07:44

## 2018-08-06 RX ADMIN — METOCLOPRAMIDE HYDROCHLORIDE 10 MG: 5 INJECTION INTRAMUSCULAR; INTRAVENOUS at 07:44

## 2018-08-06 RX ADMIN — MIDAZOLAM HYDROCHLORIDE 2 MG: 2 INJECTION, SOLUTION INTRAMUSCULAR; INTRAVENOUS at 07:25

## 2018-08-06 RX ADMIN — GLYCOPYRROLATE 0.2 MG: 0.2 INJECTION, SOLUTION INTRAMUSCULAR; INTRAVENOUS at 07:44

## 2018-08-06 RX ADMIN — FENTANYL CITRATE 100 MCG: 50 INJECTION INTRAMUSCULAR; INTRAVENOUS at 07:25

## 2018-08-06 RX ADMIN — PROPOFOL 120 MG: 10 INJECTION, EMULSION INTRAVENOUS at 07:36

## 2018-08-06 RX ADMIN — Medication 2 G: at 07:42

## 2018-08-06 ASSESSMENT — PULMONARY FUNCTION TESTS
PIF_VALUE: 8
PIF_VALUE: 8
PIF_VALUE: 7
PIF_VALUE: 8
PIF_VALUE: 0
PIF_VALUE: 8
PIF_VALUE: 2
PIF_VALUE: 8
PIF_VALUE: 0
PIF_VALUE: 8
PIF_VALUE: 8
PIF_VALUE: 17
PIF_VALUE: 8
PIF_VALUE: 18
PIF_VALUE: 8
PIF_VALUE: 8
PIF_VALUE: 1
PIF_VALUE: 8
PIF_VALUE: 15
PIF_VALUE: 8
PIF_VALUE: 1
PIF_VALUE: 8
PIF_VALUE: 10
PIF_VALUE: 1
PIF_VALUE: 8
PIF_VALUE: 8
PIF_VALUE: 22
PIF_VALUE: 8
PIF_VALUE: 0
PIF_VALUE: 8
PIF_VALUE: 8
PIF_VALUE: 21
PIF_VALUE: 8
PIF_VALUE: 8

## 2018-08-06 ASSESSMENT — PAIN SCALES - GENERAL
PAINLEVEL_OUTOF10: 0

## 2018-08-06 ASSESSMENT — PAIN - FUNCTIONAL ASSESSMENT: PAIN_FUNCTIONAL_ASSESSMENT: 0-10

## 2018-08-06 ASSESSMENT — PAIN DESCRIPTION - DESCRIPTORS: DESCRIPTORS: CONSTANT

## 2018-08-06 NOTE — ANESTHESIA PRE PROCEDURE
Department of Anesthesiology  Preprocedure Note       Name:  Danny Herbert   Age:  63 y.o.  :  1955                                          MRN:  [id number]         Date:  2018      Surgeon: Alisia Marc):  Shalonda Parsons MD    Procedure: Procedure(s):  RIGHT THUMB CARPOMETACARPAL ARTHROPLASTY, FLEXOR CARPI RADIALIS TENDON INTERPOSITION    Medications prior to admission:   Prior to Admission medications    Medication Sig Start Date End Date Taking? Authorizing Provider   sertraline (ZOLOFT) 100 MG tablet TAKE 1 TABLET BY MOUTH ONE TIME A DAY  18  Yes DANISH Newton MD   diclofenac sodium (VOLTAREN) 1 % GEL Use as needed in affected joints- 1 gram-  BID-  MAx dose 12 grams a day  Patient taking differently: 2 g as needed Use as needed in affected joints- 1 gram-  BID-  MAx dose 12 grams a day 18  Yes Mary Mccarty MD   methylphenidate (RITALIN LA) 20 MG extended release capsule Take 20 mg by mouth every morning . Yes Historical Provider, MD   modafinil (PROVIGIL) 100 MG tablet Take 200 mg by mouth daily. Latanya Murillo Historical Provider, MD   PROGESTERONE by Does not apply route daily. Yes Historical Provider, MD   multivitamin [facility]) per tablet Take 1 tablet by mouth daily. Yes Historical Provider, MD   Glucosamine 500 MG TABS Take  by mouth daily. Yes Historical Provider, MD   norethindrone-ethinyl estradiol (FEMHRT LOW DOSE) 0.5-2.5 MG-MCG per tablet Take 1 tablet by mouth daily.      Yes Historical Provider, MD   diclofenac (VOLTAREN) 75 MG EC tablet Take 1 tablet by mouth 2 times daily  Patient taking differently: Take 75 mg by mouth as needed  18   Mary Mccarty MD       Current medications:    Current Facility-Administered Medications   Medication Dose Route Frequency Provider Last Rate Last Dose    ceFAZolin (ANCEF) 2 g in sterile water 20 mL IV syringe  2 g Intravenous On Call to [facility] Roro Escobar MD        lactated ringers infusion   Intravenous Continuous Cory Can  mL/hr at 08/06/18 0648      lidocaine 1 % injection             midazolam (VERSED) 2 MG/2ML injection             fentaNYL (SUBLIMAZE) 100 MCG/2ML injection                Allergies: Allergies   Allergen Reactions    Yeast-Related Products        Problem List:    Patient Active Problem List   Diagnosis Code    Hyperlipidemia E78.5    Narcolepsy G47.419       Past Medical History:        Diagnosis Date    Arthritis     Cholelithiases May, 2013    By ultrasound    Encounter for cervical Pap smear with pelvic exam *June 6, 2018    Dr. Vinita Hernandez    Hyperlipidemia     Meniere's disease     Migraine     Narcolepsy *2016    Dr. Herrera Alvarez     Narcolepsy     Osteopenia DEXA-July, 2012    Lumbar T score +0.2 and Hip T score 0.0    Other screening mammogram 2010    Per Dr. Vinita Hernandez.      Other screening mammogram January 19, 2012    Negative    Other screening mammogram May 31, 2013    Negative    Other screening mammogram May 29, 2014    Negative    Other screening mammogram *May 24, 2017    Benign ( Dr. Vinita Hernandez )    PONV (postoperative nausea and vomiting)     Screening mammogram, encounter for *May 25, 2018    Benign ( Dr. Vinita Hernandez )       Past Surgical History:        Procedure Laterality Date    COLONOSCOPY  May, 2001 ( 2011 )    Dr. Patrizia Mendoza - normal.    COLONOSCOPY  Nov., 2012 ( 2022 )     - Primitivo    NASAL SEPTUM SURGERY  1983    OTHER SURGICAL HISTORY  *July, 2017    Dr. Jonni Burkitt - excision of a mcous retention cyst    TOE SURGERY  Nov., 2010    Dr. Emmy Pitt - right       Social History:    Social History   Substance Use Topics    Smoking status: Former Smoker     Packs/day: 1.00     Years: 2.00     Types: Cigarettes     Quit date: 1/1/1998    Smokeless tobacco: Never Used    Alcohol use Yes      Comment: 0-1 drinks per week                                Counseling given: Not Answered      Vital Signs (Current):   Vitals:    07/30/18 1422 08/06/18 0641   BP:  118/66   Pulse:  53   Resp:  16   Temp:  97.4 °F (36.3 °C)   TempSrc:  Temporal   SpO2:  100%   Weight: 124 lb (56.2 kg) 124 lb (56.2 kg)   Height: 5' 4\" (1.626 m) 5' 4\" (1.626 m)                                              BP Readings from Last 3 Encounters:   08/06/18 118/66   07/26/18 120/80   04/11/18 118/76       NPO Status: Time of last liquid consumption: 2330                        Time of last solid consumption: 2130                        Date of last liquid consumption: 08/05/18                        Date of last solid food consumption: 08/05/18    BMI:   Wt Readings from Last 3 Encounters:   08/06/18 124 lb (56.2 kg)   07/26/18 124 lb (56.2 kg)   04/11/18 127 lb (57.6 kg)     Body mass index is 21.28 kg/m². CBC:   Lab Results   Component Value Date    WBC 8.9 04/10/2018    RBC 4.36 04/10/2018    HGB 13.8 04/10/2018    HCT 40.4 04/10/2018    MCV 92.6 04/10/2018    RDW 12.8 04/10/2018     04/10/2018       CMP:   Lab Results   Component Value Date     04/10/2018    K 4.3 04/10/2018    CL 94 04/10/2018    CO2 25 04/10/2018    BUN 15 04/10/2018    CREATININE 0.5 04/10/2018    GFRAA >60 04/10/2018    GFRAA >60 04/06/2013    AGRATIO 2.2 04/10/2018    LABGLOM >60 04/10/2018    GLUCOSE 88 04/10/2018    PROT 7.6 04/10/2018    CALCIUM 9.9 04/10/2018    BILITOT 0.3 04/10/2018    ALKPHOS 83 04/10/2018    AST 15 04/10/2018    ALT 13 04/10/2018       POC Tests: No results for input(s): POCGLU, POCNA, POCK, POCCL, POCBUN, POCHEMO, POCHCT in the last 72 hours. Coags: No results found for: PROTIME, INR, APTT    HCG (If Applicable): No results found for: PREGTESTUR, PREGSERUM, HCG, HCGQUANT     ABGs: No results found for: PHART, PO2ART, MMB8NMZ, FCV7TDL, BEART, K1HCKLRI     Type & Screen (If Applicable):  No results found for: [facility]    Anesthesia Evaluation  Patient summary reviewed   history of anesthetic complications: PONV.   Airway: Mallampati: I  TM distance: >3 FB

## 2018-08-06 NOTE — PROGRESS NOTES
Advancing hob without compaints  Tolerating DrinkingNo ponv  Pt alert and appropriate  Respirations  Easy/ unlabored/ no Chest pain or SOB   Surgical drsg unchanged from initial assessment  Circulation checks on operative limb unchanged from last entry in pacu

## 2018-08-17 ENCOUNTER — OFFICE VISIT (OUTPATIENT)
Dept: ORTHOPEDIC SURGERY | Age: 63
End: 2018-08-17

## 2018-08-17 DIAGNOSIS — M18.11 ARTHRITIS OF CARPOMETACARPAL (CMC) JOINT OF RIGHT THUMB: ICD-10-CM

## 2018-08-17 DIAGNOSIS — M79.641 PAIN OF RIGHT HAND: Primary | ICD-10-CM

## 2018-08-17 PROCEDURE — 29085 APPL CAST HAND&LWR FOREARM: CPT | Performed by: ORTHOPAEDIC SURGERY

## 2018-08-17 PROCEDURE — 99024 POSTOP FOLLOW-UP VISIT: CPT | Performed by: ORTHOPAEDIC SURGERY

## 2018-09-07 ENCOUNTER — HOSPITAL ENCOUNTER (OUTPATIENT)
Dept: OCCUPATIONAL THERAPY | Age: 63
Setting detail: THERAPIES SERIES
Discharge: HOME | End: 2018-09-07
Payer: COMMERCIAL

## 2018-09-07 ENCOUNTER — OFFICE VISIT (OUTPATIENT)
Dept: ORTHOPEDIC SURGERY | Age: 63
End: 2018-09-07

## 2018-09-07 DIAGNOSIS — M18.11 ARTHRITIS OF CARPOMETACARPAL (CMC) JOINT OF RIGHT THUMB: Primary | ICD-10-CM

## 2018-09-07 PROCEDURE — 99024 POSTOP FOLLOW-UP VISIT: CPT | Performed by: ORTHOPAEDIC SURGERY

## 2018-09-07 PROCEDURE — L3808 WHFO, RIGID W/O JOINTS: HCPCS | Performed by: OCCUPATIONAL THERAPIST

## 2018-09-07 NOTE — PROGRESS NOTES
Chief Complaint:  Follow-up (CK R THUMB)      History of Present of Illness: The patient is now about 1 month out from her 8/5/2018 right thumb CMC arthroplasty. She has been doing fairly well but does report that with increased use of her left hand her left thumb is once again very painful as well. Review of Systems  Pertinent items are noted in HPI  Denies fever, chills, confusion, bowel/bladder active change. Review of systems reviewed from Patient History Form dated on 3/16/2018 and available in the patient's chart under the Media tab. Examination:  On exam today her incisions are healing nicely and there is no sign of infection or significant swelling. She has good perfusion and sensation to the fingers. Radiology:     X-rays obtained and reviewed in office:  Views    Location    Impression      No orders of the defined types were placed in this encounter. Impression:  Encounter Diagnosis   Name Primary?  Arthritis of carpometacarpal (CMC) joint of right thumb Yes         Treatment Plan: Today we will have her start her therapy program as planned. We also talked about expectations for the surgical right thumb and the ongoing pain in the opposite left thumb. I will plan to see her back in 6 weeks. If she is coming along well at that juncture, we may want to sit down and also talk about strategies and timing for even she will left thumb CMC arthroplasty. Please note that this transcription was created using voice recognition software. Any errors are unintentional and may be due to voice recognition transcription.

## 2018-09-07 NOTE — PLAN OF CARE
ongoing pain over the last ear  Onset/Surgery Date: 8/6/18  Dominant Hand:    [x] Right []Left  Occupational/Vocational Status: [employer]   Progress of any previous OT/PT: the patient []has/ [x]has not received OT/PT previously for this diagnosis. Pain: 5/10    Objective Findings as appropriate:  ROM, strength, edema, wound/ scar appearance, function:    Type of splint:   Thumb spica  Splint protocol utilization: To wear splint at all times except for HEP and cleansing  Splint Purpose: [x]Immobilize or protect []Promote healing of    []Relieve pain  []Provide support for improved hand function []Maximize joint motion    Treatment:   [x]Splint provided ([x]Customized/ []Prefabricated), and splint rationale explained. []Patient instructed in []wear/ []care of splint and educated regarding diagnosis. []Patient instructed in symptom reduction techniques   []HEP instruction    []Discussed ADL assistive device    Written Information Distributed: [x]HEP  [x]Splint care and wearing protocol    Patient response to evaluation and instructions:  [x]Attentive/interested   []Asked questions/ retained info  []Appeared disinterested  []Poor retention of information  []Appeared anxious/ fearful    Assessment and Plan:  Goals: [x]Patient will be able to verbalize rationale for, and demonstrate proper wearing     of splint. []Splint will provide proper fit and function. [x]Patient will be able to verbalize 2-3 ways to prevent further symptoms. [x]Patient will be able to don and doff independently. [x]Patient will be independent with HEP    Goals met:  [x]yes []no    Plan:  []Splint completed with good fit and function. Hand Therapy to follow up for     splint modifications as needed    [x]Splint completed; OT/PT evaluation initiated. Patient to return for further     treatment.     Merian Aschoff, OT/L [id number]

## 2018-09-10 ENCOUNTER — HOSPITAL ENCOUNTER (OUTPATIENT)
Dept: OCCUPATIONAL THERAPY | Age: 63
Setting detail: THERAPIES SERIES
Discharge: HOME | End: 2018-09-10
Payer: COMMERCIAL

## 2018-09-10 PROCEDURE — G8985 CARRY GOAL STATUS: HCPCS | Performed by: OCCUPATIONAL THERAPIST

## 2018-09-10 PROCEDURE — 97018 PARAFFIN BATH THERAPY: CPT | Performed by: OCCUPATIONAL THERAPIST

## 2018-09-10 PROCEDURE — G8984 CARRY CURRENT STATUS: HCPCS | Performed by: OCCUPATIONAL THERAPIST

## 2018-09-10 PROCEDURE — 97110 THERAPEUTIC EXERCISES: CPT | Performed by: OCCUPATIONAL THERAPIST

## 2018-09-10 PROCEDURE — 97022 WHIRLPOOL THERAPY: CPT | Performed by: OCCUPATIONAL THERAPIST

## 2018-09-10 PROCEDURE — 97165 OT EVAL LOW COMPLEX 30 MIN: CPT | Performed by: OCCUPATIONAL THERAPIST

## 2018-09-10 NOTE — FLOWSHEET NOTE
[facility], [address] [place]  Phone: [phone]  Fax [phone]  Hand Therapy Daily Treatment Note  Date:  9/10/2018    Patient: Roxana Quinones   : 1955   MRN: [id number]  Referring Physician: Referring Practitioner: Taniya Farley MD       Medical Diagnosis Information:   Diagnosis: M18.11 (ICD-10-CM) - Arthritis of carpometacarpal [place]) joint of right thumb,          Date of injury: gradual onset over the last 10 years  Date of Surgery/Type of procedure:   R THUMB [facility] ARTHROPLASTY 18                                   Insurance information:OT Insurance Information: 18 - UMR - $0CP - $4000DED(MET) - 90/10% - 60PT/OT - NO AUTH   Comorbidities Affecting Functional Performance:     []Anxiety (F41.9)/Depression (F32.9)   []Diabetes Type 1(E10.65) or 2 (E11.65)   []Rheumatoid Arthritis (M05.9)  []Fibromyalgia (M79.7)  []Neuropathy(G60.9)  []Osteoarthritis(M19.91)  [x]None   []Other:    G-code: OT G-codes  Functional Assessment Tool Used: DASH  Score: 68%  Carrying, Moving and Handling Objects Current Status (): At least 60 percent but less than 80 percent impaired, limited or restricted  Carrying, Moving and Handling Objects Goal Status ():  At least 20 percent but less than 40 percent impaired, limited or restricted    Date of Patient follow up with Physician:  Preferred Language for Healthcare:   [x]English       []other:  Latex Allergy:  [x]NO      []YES  RESTRICTIONS/PRECAUTIONS:   NONE  Progress Note: [x]  Yes  []  No  Next due by : Visit #10       Visit # Insurance Allowable Requires auth   2 60    no:[]                  yes:[]    From 9/10/18 to 9/10/2018    Pain level:  5/10     SUBJECTIVE: Pt reports compliance with HEP    OBJECTIVE:   Date:  Hand Dominance:     [x]  Right    [] Left 9/10/2018      Objective Measures:     PAIN 5/10   Quick DASH 68%   Digits WFL   Thumb ROM MP 20  IP [] (32681) Provided manual therapy to mobilize soft tissue/joints of the UE for the purpose of modulating pain, promoting relaxation,  increasing ROM, reducing/eliminating soft tissue swelling/inflammation/restriction, improving soft tissue extensibility and allowing for proper ROM for normal function with self care, reaching, carrying, lifting, house/yardwork, driving/computer work    Splinting:  [] Fabrication of: L-code  [] (44853) Checkout for orthotic/prosthetic use, established patient   [] (40704) Orthotic management and training (fitting and assessment)  [] Comments:    Charges:  Timed Code Treatment Minutes: 10'   Total Treatment Minutes: 76'   [x] EVAL (LOW) 22 [id number]   [] OT Re-eval (71890)  [] EVAL (MOD) 55526   [] EVAL (HIGH) [phone]       [x] Anjel [phone]) x      [] ZMQDV(12914)  [] NMR (09142) x      [] Estim (attended) (80251)   [] Manual ([id number]) x       [] US (54608)  [] TA (11774) x      [x] Paraffin (26391)  [] ADL  (97328) x     [] Splint/L code:    [] Estim (unattended) (97374)  [] Other:  [x] Fluidotherapy (68809)  [](46462) Checkout for orthotic use, established patient x       [] (54074) Orthotic mgmt & training  x        GOALS:  Short Term Goals: To be achieved in: 2 weeks  1. Independent in HEP and progression per patient tolerance, in order to prevent re-injury. 2. Patient will have a decrease in pain to facilitate improvement in movement, function, and ADLs as indicated by Functional Deficits. Long Term Goals to be achieved in 8  weeks, including patient directed goals to address identified performance deficits:  1) Pt to be independent in graded HEP progression with a good level of effort and compliance. 2) Pt to report a score of 68 % or less on the Quick DASH disability questionnaire for increased performance with carrying, moving, and handling objects.   3) Pt will demonstrate increased ROM to [facility] for improved performance with self care activities, driving, vocational tasks, house hold

## 2018-09-10 NOTE — PLAN OF CARE
Patient reported history, allergies, and medications reviewed - see intake form. SUBJECTIVE:   Background/Relevant Medical & Therapy History: gradual onset of pain over the last 10 years      Pain Scale: 5/10   []Constant      [x]Intermittent    []other:  Pain Location:  CMC jt  Easing factors: rest  Provocative factors: AROM      Occupational Profile:  Home Enviroment: lives with  [x] spouse,  [] family,  [] alone,  [] significant other,   [] other:    Occupation/School:     Recreational Activities/Meaningful Interests: art, exercise-work out    Prior Level of Function: [x] Independent with ADLs/IADLs     [] Assistance needed (describe):    Patient-Identified Primary Performance Deficits (to be addressed in POC):   [x] bathing    [x] household tasks (cooking/cleaning)   [x] dressing    [] self feeding   [x] grooming    [x] work/education   [] functional mobility   [] sleeping/rest   [] toileting/hygiene   [x] recreational activities   [x] driving    [] community/social participation   [] other:     Comorbidities Affecting Functional Performance:     []Anxiety (F41.9)/Depression (F32.9)   []Diabetes Type 1(E10.65) or 2 (E11.65)   []Rheumatoid Arthritis (M05.9)  []Fibromyalgia (M79.7)  []Neuropathy(G60.9)  [x]Osteoarthritis(M19.91)  []None   []Other;    OBJECTIVE:   Date:  Hand Dominance:     [x]  Right    [] Left 9/10/2018     Objective Measures:    PAIN 5/10   Quick DASH 68%   Digits WFL   Thumb ROM MP 20  IP 45   Thumb opposition  R:2,3  L:   Thumb Radial/Palmar abd ROM R:  L:   Wrist ROM Ext/Flex R:44/35  L:   Rad/Uln dev ROM R:  L:   Forearm ROM  Sup/pron R:  L:   Elbow ROM Ext/flex R:  L:   Shoulder Flex  Shoulder Abd  Shoulder IR/ER    Edema in cm circumf. MCPJs R:  L:   Edema in cm circumf.   Wrist R:  L:    strength in lbs R:  L:   Pinch Strengthin lbs: lat  R:  L:   Pinch Strength in lbs:  3 point R:  L:     MMT:       Observations (including splints, bandages, incisions, scars):   Fernando strips present    Sensation: [] No reported deficits  [] Intact to light touch    [] [place] Tricia test completed, findings as noted:  [x] Other:mild N+T at scar area    Palpation: NT    Functional Mobility/Transfers/Gait: [x] Independent - no significant gait deviations  [] Assistance needed   [] Assistive device used: Falls Risk Assessment (30 days):   [x] Falls Risk assessed and no intervention required. [] Falls Risk assessed and Patient requires intervention due to being higher risk   TUG score (>12s at risk):     [] Falls education provided, including      Review Of Systems (ROS): [x]Performed Review of systems (Integumentary, CardioPulmonary, Neurological) by intake and observation. Intake form has been scanned into medical record. Patient has been instructed to contact their primary care physician regarding ROS issues if not already being addressed at this time. ASSESSMENT:   This patient presents with signs and symptoms consistent with the medical diagnosis provided by the referring physician. Impairments (physical, cognitive and/or psychosocial):  [x] Decreased mobility   [x] Weakness    [] Hypersensitivity   [x] Pain/tenderness   [] Edema/swelling   [x] Decreased coordination (fine/gross motor)   [] Impaired body mechanics  [] Sensory loss  [] Loss of balance   [] Other:      Performance Deficits (to be addressed in plan of care):   [x] Bathing    [x] Household Tasks (cooking/cleaning)   [x] Dressing    [] Self Feeding   [x] Grooming    [x] Work/Education   [x] Functional Mobility   [x] Sleeping/Rest   [] Toileting/Hygiene   [x] Recreational Activities   [x] Driving    [] Community/Social Participation   [] Other:     Rehab Potential:   [] Excellent [] Good [] Fair  [] Poor     Barriers affecting rehab potential:  []Age    []Lack of Motivation   []Co-Morbidities  []Cognitive Function  []Environmental/home/work barriers  []Other:     Tolerance of evaluation/treatment:    [] Excellent [x]

## 2018-09-14 ENCOUNTER — HOSPITAL ENCOUNTER (OUTPATIENT)
Dept: OCCUPATIONAL THERAPY | Age: 63
Setting detail: THERAPIES SERIES
Discharge: HOME | End: 2018-09-14
Payer: COMMERCIAL

## 2018-09-14 PROCEDURE — 97018 PARAFFIN BATH THERAPY: CPT | Performed by: OCCUPATIONAL THERAPIST

## 2018-09-14 PROCEDURE — 97140 MANUAL THERAPY 1/> REGIONS: CPT | Performed by: OCCUPATIONAL THERAPIST

## 2018-09-14 PROCEDURE — 97022 WHIRLPOOL THERAPY: CPT | Performed by: OCCUPATIONAL THERAPIST

## 2018-09-14 PROCEDURE — 97110 THERAPEUTIC EXERCISES: CPT | Performed by: OCCUPATIONAL THERAPIST

## 2018-09-17 ENCOUNTER — HOSPITAL ENCOUNTER (OUTPATIENT)
Dept: OCCUPATIONAL THERAPY | Age: 63
Setting detail: THERAPIES SERIES
Discharge: HOME | End: 2018-09-17
Payer: COMMERCIAL

## 2018-09-17 PROCEDURE — 97140 MANUAL THERAPY 1/> REGIONS: CPT | Performed by: OCCUPATIONAL THERAPIST

## 2018-09-17 PROCEDURE — 97018 PARAFFIN BATH THERAPY: CPT | Performed by: OCCUPATIONAL THERAPIST

## 2018-09-17 PROCEDURE — 97110 THERAPEUTIC EXERCISES: CPT | Performed by: OCCUPATIONAL THERAPIST

## 2018-09-17 PROCEDURE — 97022 WHIRLPOOL THERAPY: CPT | Performed by: OCCUPATIONAL THERAPIST

## 2018-09-17 NOTE — FLOWSHEET NOTE
Dominance:     [x]  Right    [] Left 9/10/2018      Objective Measures:     PAIN 5/10   Quick DASH 68%   Digits WFL   Thumb ROM MP 20  IP 45   Thumb opposition  R:2,3  L:   Thumb Radial/Palmar abd ROM R:  L:   Wrist ROM Ext/Flex R:44/35  L:   Rad/Uln dev ROM R:  L:   Forearm ROM  Sup/pron R:  L:   Elbow ROM Ext/flex R:  L:   Shoulder Flex  Shoulder Abd  Shoulder IR/ER     Edema in cm circumf. MCPJs R:  L:   Edema in cm circumf. Wrist R:  L:    strength in lbs R:  L:   Pinch Strengthin lbs: lat  R:  L:   Pinch Strength in lbs:  3 point R:  L:      MMT:            Modalities: 9/10/18   9/14/18  9/17/18   Fluido 15' 15' 15'   Para + HP L hand Same  same   Therapeutic Exercise & Activities:      AROM Wrist and hand  Wrist and thumb x 10 each     beans Grasp/release 5'    Para squeeze x 2' X   Sponge    Flex activity                                         Therapeutic Exercise and NMR EXR  [x] (67057) Provided verbal/tactile cueing for activities related to strengthening, flexibility, endurance, ROM  for improvements in scapular, scapulothoracic and UE control with self care, reaching, carrying, lifting, house/yardwork, driving/computer work.    [] (01316) Provided verbal/tactile cueing for activities related to improving balance, coordination, kinesthetic sense, posture, motor skill, proprioception  to assist with  scapular, scapulothoracic and UE control with self care, reaching, carrying, lifting, house/yardwork, driving/computer work. Therapeutic Activities:    [] ( 00794) therapeutic activities, direct (one on one) patient contact. Use of dynamic activities to improve functional performance.     Activities of Daily Living:  [] (14347) Provided self-care/home management training (i.e., activities of daily living and compensatory training, meal preparation, safety procedures, and instructions in use of assistive technology devices/adaptive equipment)     Home Exercise Program:  AROM in media   [x] (89269) Reviewed/Progressed HEP activities related to strengthening, flexibility, endurance, ROM of scapular, scapulothoracic and UE control with self care, reaching, carrying, lifting, house/yardwork, driving/computer work    Manual Treatments:  STM B thumbs 10'  [x] (99572) Provided manual therapy to mobilize soft tissue/joints of the UE for the purpose of modulating pain, promoting relaxation,  increasing ROM, reducing/eliminating soft tissue swelling/inflammation/restriction, improving soft tissue extensibility and allowing for proper ROM for normal function with self care, reaching, carrying, lifting, house/yardwork, driving/computer work    Splinting:  [] Fabrication of: L-code  [] (60396) Checkout for orthotic/prosthetic use, established patient   [] (02286) Orthotic management and training (fitting and assessment)  [] Comments:    Charges:  Timed Code Treatment Minutes: 30   Total Treatment Minutes: 55   [] EVAL (LOW) 78445   [] OT Re-eval (63859)  [] EVAL (MOD) 56463   [] EVAL (HIGH) 46261       [x] Anjel (85095) x      [] ZVJIG(24272)  [] NMR (50428) x      [] Estim (attended) (01099)   [x] Manual ([id number]) x       [] US (49771)  [] TA (57773) x      [x] Paraffin (17810)  [] ADL  (88 649 24 60) x     [] Splint/L code:    [] Estim (unattended) (33944)  [] Other:  [x] Fluidotherapy (09454)  [](83864) Checkout for orthotic use, established patient x       [] (50634) Orthotic mgmt & training  x        GOALS:  Short Term Goals: To be achieved in: 2 weeks  1. Independent in HEP and progression per patient tolerance, in order to prevent re-injury. 2. Patient will have a decrease in pain to facilitate improvement in movement, function, and ADLs as indicated by Functional Deficits. Long Term Goals to be achieved in 8  weeks, including patient directed goals to address identified performance deficits:  1) Pt to be independent in graded HEP progression with a good level of effort and compliance.   2) Pt to report a score of

## 2018-09-18 ENCOUNTER — OFFICE VISIT (OUTPATIENT)
Dept: ORTHOPEDIC SURGERY | Age: 63
End: 2018-09-18

## 2018-09-18 DIAGNOSIS — M18.11 ARTHRITIS OF CARPOMETACARPAL (CMC) JOINT OF RIGHT THUMB: Primary | ICD-10-CM

## 2018-09-18 DIAGNOSIS — M18.12 ARTHRITIS OF CARPOMETACARPAL (CMC) JOINT OF LEFT THUMB: ICD-10-CM

## 2018-09-18 PROCEDURE — 99212 OFFICE O/P EST SF 10 MIN: CPT | Performed by: ORTHOPAEDIC SURGERY

## 2018-09-18 PROCEDURE — 20600 DRAIN/INJ JOINT/BURSA W/O US: CPT | Performed by: ORTHOPAEDIC SURGERY

## 2018-09-18 PROCEDURE — L3918 METACARP FX ORTHOSIS PRE OTS: HCPCS | Performed by: ORTHOPAEDIC SURGERY

## 2018-09-18 NOTE — PROGRESS NOTES
Chief Complaint:  Follow-up (CK L THUMB ARTHRITIS- WANTS INJECTION)      History of Present of Illness: The patient returns today for a different problem. She has been overall doing fairly well after the right thumb CMC arthroplasty but she has been experiencing increased pain along the left thumb that she has been utilizing her left thumb more actively and favoring the right. She did have an injection at the time of her surgery on the right thumb but really didn't get much relief from that injection. Now she feels that her left thumb CMC joint is hurting worse than the recently operated right thumb. Review of Systems  Pertinent items are noted in HPI  Denies fever, chills, confusion, bowel/bladder active change. Review of systems reviewed from Patient History Form dated on 3/16/2018 and available in the patient's chart under the Media tab. Examination:  On exam there is some fullness over the left thumb CMC joint and tenderness with firm pressure. There is a positive CMC grind. Fingers are perfused and sensate and there is no triggering. On the right thumb her incision lines are nicely healed and there are no signs of active infection. Radiology:     X-rays obtained and reviewed in office:  Views     Location    Impression      Orders Placed This Encounter   Procedures    NC BETAMETHASONE ACET&SOD PHOSP    NC ARTHROCENTESIS ASPIR&/INJ SMALL JT/BURSA W/O US    [place] CMC Restriction Brace     Patient was prescribed a [place] CMC Restriction Brace. The left thumb will require stabilization / immobilization from this semi-rigid / rigid orthosis to improve their function. The orthosis will assist in protecting the affected area, provide functional support and facilitate healing. The patient was educated and fit by a healthcare professional with expert knowledge and specialization in brace application while under the direct supervision of the physician.   Verbal and written

## 2018-09-21 ENCOUNTER — HOSPITAL ENCOUNTER (OUTPATIENT)
Dept: OCCUPATIONAL THERAPY | Age: 63
Setting detail: THERAPIES SERIES
Discharge: HOME | End: 2018-09-21
Payer: COMMERCIAL

## 2018-09-21 PROCEDURE — 97110 THERAPEUTIC EXERCISES: CPT | Performed by: OCCUPATIONAL THERAPIST

## 2018-09-21 PROCEDURE — 97022 WHIRLPOOL THERAPY: CPT | Performed by: OCCUPATIONAL THERAPIST

## 2018-09-21 PROCEDURE — 97140 MANUAL THERAPY 1/> REGIONS: CPT | Performed by: OCCUPATIONAL THERAPIST

## 2018-09-21 PROCEDURE — 97018 PARAFFIN BATH THERAPY: CPT | Performed by: OCCUPATIONAL THERAPIST

## 2018-09-21 NOTE — FLOWSHEET NOTE
performance deficits:  1) Pt to be independent in graded HEP progression with a good level of effort and compliance. 2) Pt to report a score of 68 % or less on the Quick DASH disability questionnaire for increased performance with carrying, moving, and handling objects. 3) Pt will demonstrate increased ROM to [facility] for improved performance with self care activities, driving, vocational tasks, house hold chores, and fine object manipulation  4) Pt will demonstrate increased  strength to at least  25# for improved performance with self care activities, driving, vocational tasks, house hold chores, and fine object manipulation    5) Pt will have a decrease in pain to 2/10 or less to facilitate improvement in performance with self care activities, driving, vocational tasks, house hold chores, and fine object manipulation    Progression Towards Functional goals:  [x] Patient is progressing as expected towards functional goals listed. [] Progression is slowed due to complexities listed. [] Progression has been slowed due to co-morbidities.   [] Plan just implemented, too soon to assess goals progression  [] Other:     ASSESSMENT:    Treatment/Activity Tolerance:  [x] Patient tolerated treatment well [] Patient limited by fatique  [] Patient limited by pain  [] Patient limited by other medical complications  [] Other:     Prognosis: [x] Good [] Fair  [] Poor    Patient Requires Follow-up: [x] Yes  [] No    PLAN: Recommend Occupational Therapy 1-2 times a week for 8 weeks  [x] Continue per plan of care [] Alter current plan (see comments)  [] Plan of care initiated [] Hold pending MD visit [] Discharge    Plan for next session: pain mgt, ROM, STM   Thermal modalities, functional activities and strengthening as inicated, AROM, PROM as indicated    Electronically signed by: Penne Manual OTR/L

## 2018-09-24 ENCOUNTER — HOSPITAL ENCOUNTER (OUTPATIENT)
Dept: OCCUPATIONAL THERAPY | Age: 63
Setting detail: THERAPIES SERIES
Discharge: HOME | End: 2018-09-24
Payer: COMMERCIAL

## 2018-09-24 PROCEDURE — 97110 THERAPEUTIC EXERCISES: CPT | Performed by: OCCUPATIONAL THERAPIST

## 2018-09-24 PROCEDURE — 97140 MANUAL THERAPY 1/> REGIONS: CPT | Performed by: OCCUPATIONAL THERAPIST

## 2018-09-24 PROCEDURE — 97018 PARAFFIN BATH THERAPY: CPT | Performed by: OCCUPATIONAL THERAPIST

## 2018-09-24 PROCEDURE — 97022 WHIRLPOOL THERAPY: CPT | Performed by: OCCUPATIONAL THERAPIST

## 2018-09-24 NOTE — FLOWSHEET NOTE
training, meal preparation, safety procedures, and instructions in use of assistive technology devices/adaptive equipment)     Home Exercise Program:  AROM in media   [x] (49406) Reviewed/Progressed HEP activities related to strengthening, flexibility, endurance, ROM of scapular, scapulothoracic and UE control with self care, reaching, carrying, lifting, house/yardwork, driving/computer work    Manual Treatments:  STM R thumb and scar mass  10'  [x] (22369) Provided manual therapy to mobilize soft tissue/joints of the UE for the purpose of modulating pain, promoting relaxation,  increasing ROM, reducing/eliminating soft tissue swelling/inflammation/restriction, improving soft tissue extensibility and allowing for proper ROM for normal function with self care, reaching, carrying, lifting, house/yardwork, driving/computer work    Splinting:  [] Fabrication of: L-code  [] (82498) Checkout for orthotic/prosthetic use, established patient   [] (37922) Orthotic management and training (fitting and assessment)  [] Comments:    Charges:  Timed Code Treatment Minutes: 40'   Total Treatment Minutes: 54'   [] EVAL (LOW) 22 [id number]   [] OT Re-eval (19749)  [] EVAL (MOD) 91007   [] EVAL (HIGH) 45568       [x] Anjel (28069) x      [] GGQDA(27545)  [] NMR (24968) x      [] Estim (attended) (74560)   [x] Manual ([id number]) x       [] US (67703)  [] TA (80709) x      [x] Paraffin (77378)  [] ADL  (88 649 24 60) x     [] Splint/L code:    [] Estim (unattended) (55337)  [] Other:  [x] Fluidotherapy (02963)  [](34689) Checkout for orthotic use, established patient x       [] (21692) Orthotic mgmt & training  x        GOALS:  Short Term Goals: To be achieved in: 2 weeks  1. Independent in HEP and progression per patient tolerance, in order to prevent re-injury. 2. Patient will have a decrease in pain to facilitate improvement in movement, function, and ADLs as indicated by Functional Deficits.     Long Term Goals to be achieved in 8  weeks, including patient directed goals to address identified performance deficits:  1) Pt to be independent in graded HEP progression with a good level of effort and compliance. 2) Pt to report a score of 68 % or less on the Quick DASH disability questionnaire for increased performance with carrying, moving, and handling objects. 3) Pt will demonstrate increased ROM to [facility] for improved performance with self care activities, driving, vocational tasks, house hold chores, and fine object manipulation  4) Pt will demonstrate increased  strength to at least  25# for improved performance with self care activities, driving, vocational tasks, house hold chores, and fine object manipulation    5) Pt will have a decrease in pain to 2/10 or less to facilitate improvement in performance with self care activities, driving, vocational tasks, house hold chores, and fine object manipulation    Progression Towards Functional goals:  [x] Patient is progressing as expected towards functional goals listed. [] Progression is slowed due to complexities listed. [] Progression has been slowed due to co-morbidities.   [] Plan just implemented, too soon to assess goals progression  [] Other:     ASSESSMENT:    Treatment/Activity Tolerance:  [x] Patient tolerated treatment well [] Patient limited by fatique  [] Patient limited by pain  [] Patient limited by other medical complications  [] Other:     Prognosis: [x] Good [] Fair  [] Poor    Patient Requires Follow-up: [x] Yes  [] No    PLAN: Recommend Occupational Therapy 1-2 times a week for 8 weeks  [x] Continue per plan of care [] Alter current plan (see comments)  [] Plan of care initiated [] Hold pending MD visit [] Discharge    Plan for next session: pain mgt, ROM, STM   Thermal modalities, functional activities and strengthening as inicated, AROM, PROM as indicated    Electronically signed by: Darryle Mulders OT/L  [id number]

## 2018-09-27 ENCOUNTER — HOSPITAL ENCOUNTER (OUTPATIENT)
Dept: OCCUPATIONAL THERAPY | Age: 63
Setting detail: THERAPIES SERIES
Discharge: HOME | End: 2018-09-27
Payer: COMMERCIAL

## 2018-09-27 PROCEDURE — 97022 WHIRLPOOL THERAPY: CPT | Performed by: OCCUPATIONAL THERAPIST

## 2018-09-27 PROCEDURE — 97110 THERAPEUTIC EXERCISES: CPT | Performed by: OCCUPATIONAL THERAPIST

## 2018-09-27 PROCEDURE — 97018 PARAFFIN BATH THERAPY: CPT | Performed by: OCCUPATIONAL THERAPIST

## 2018-09-27 PROCEDURE — 97140 MANUAL THERAPY 1/> REGIONS: CPT | Performed by: OCCUPATIONAL THERAPIST

## 2018-09-27 NOTE — FLOWSHEET NOTE
Objective Measures:      PAIN 5/10    Quick DASH 68%    Digits WFL    Thumb ROM MP 20  IP 45 MP 30  IP 40   Thumb opposition  R:2,3  L: R: 2   Thumb Radial/Palmar abd ROM R:  L: R: 55   Wrist ROM Ext/Flex R:44/35  L: 54/46   Rad/Uln dev ROM R:  L:    Forearm ROM  Sup/pron R:  L:    Elbow ROM Ext/flex R:  L:    Shoulder Flex  Shoulder Abd  Shoulder IR/ER      Edema in cm circumf. MCPJs R:  L:    Edema in cm circumf. Wrist R:  L:     strength in lbs R:  L:    Pinch Strengthin lbs: lat  R:  L:    Pinch Strength in lbs:  3 point R:  L:       MMT:             Modalities: 9/10/18   9/14/18  9/17/18 9/21/18  9/24/18 9/27/18    Fluido 15' 15' 15' 15' 15' 15'   Para + HP L hand Same  same Same  same Same    Therapeutic Exercise & Activities:         AROM Wrist and hand  Wrist and thumb x 10 each   Wrist and thumb x 15 each     PROM thumb and wrist 5'  PROM wrist and thumb (added for home)   beans Grasp/release 5'       Para squeeze x 2' X 5' 5' 5' B    Sponge    Flex activity Same  same Same R   Roll and  putty     5' yellow Same R    Power web       Red x 10 R   Flex bar       Red wrist flex ext x 20                                 Therapeutic Exercise and NMR EXR  [x] (26353) Provided verbal/tactile cueing for activities related to strengthening, flexibility, endurance, ROM  for improvements in scapular, scapulothoracic and UE control with self care, reaching, carrying, lifting, house/yardwork, driving/computer work.    [] (77897) Provided verbal/tactile cueing for activities related to improving balance, coordination, kinesthetic sense, posture, motor skill, proprioception  to assist with  scapular, scapulothoracic and UE control with self care, reaching, carrying, lifting, house/yardwork, driving/computer work. Therapeutic Activities:    [] ( 53091) therapeutic activities, direct (one on one) patient contact. Use of dynamic activities to improve functional performance.     Activities of Daily

## 2018-10-02 ENCOUNTER — HOSPITAL ENCOUNTER (OUTPATIENT)
Dept: OCCUPATIONAL THERAPY | Age: 63
Setting detail: THERAPIES SERIES
Discharge: HOME | End: 2018-10-02
Payer: COMMERCIAL

## 2018-10-02 PROCEDURE — 97140 MANUAL THERAPY 1/> REGIONS: CPT | Performed by: OCCUPATIONAL THERAPIST

## 2018-10-02 PROCEDURE — 97018 PARAFFIN BATH THERAPY: CPT | Performed by: OCCUPATIONAL THERAPIST

## 2018-10-02 PROCEDURE — 97110 THERAPEUTIC EXERCISES: CPT | Performed by: OCCUPATIONAL THERAPIST

## 2018-10-02 PROCEDURE — 97112 NEUROMUSCULAR REEDUCATION: CPT | Performed by: OCCUPATIONAL THERAPIST

## 2018-10-02 NOTE — FLOWSHEET NOTE
Activities:    [] ( 82053) therapeutic activities, direct (one on one) patient contact. Use of dynamic activities to improve functional performance. Activities of Daily Living:  [] (62269) Provided self-care/home management training (i.e., activities of daily living and compensatory training, meal preparation, safety procedures, and instructions in use of assistive technology devices/adaptive equipment)     Home Exercise Program:  AROM in media   [x] (06310) Reviewed/Progressed HEP activities related to strengthening, flexibility, endurance, ROM of scapular, scapulothoracic and UE control with self care, reaching, carrying, lifting, house/yardwork, driving/computer work    Manual Treatments:  STM R thumb and scar mass  DTM L 10'  [x] (00616) Provided manual therapy to mobilize soft tissue/joints of the UE for the purpose of modulating pain, promoting relaxation,  increasing ROM, reducing/eliminating soft tissue swelling/inflammation/restriction, improving soft tissue extensibility and allowing for proper ROM for normal function with self care, reaching, carrying, lifting, house/yardwork, driving/computer work    Splinting:  [] Fabrication of: L-code  [] (87346) Checkout for orthotic/prosthetic use, established patient   [] (26416) Orthotic management and training (fitting and assessment)  [] Comments:    Charges:  Timed Code Treatment Minutes: 38   Total Treatment Minutes: 48   [] EVAL (LOW) 30914   [] OT Re-eval (53032)  [] EVAL (MOD) 96093   [] EVAL (HIGH) 09804       [x] Anjel (58404) x      [] JMQDB(94060)  [x] NMR (76861) x      [] Estim (attended) (27973)   [x] Manual ([id number]) x       [] US (02286)  [] TA (67972) x      [x] Paraffin (03911)  [] ADL  (88 649 24 60) x     [] Splint/L code:    [] Estim (unattended) (03265)  [] Other:  [] Maria E (03277)  [](12341) Checkout for orthotic use, established patient x       [] (22504) Orthotic mgmt & training  x        GOALS:  Short Term Goals:  To be achieved in: 2 visit [] Discharge    Plan for next session: pain mgt, ROM, STM   Thermal modalities, functional activities and strengthening as inicated, AROM, PROM as indicated    Electronically signed by: Anthony Candelario OTR/CHACORTA

## 2018-10-04 ENCOUNTER — HOSPITAL ENCOUNTER (OUTPATIENT)
Dept: OCCUPATIONAL THERAPY | Age: 63
Setting detail: THERAPIES SERIES
Discharge: HOME | End: 2018-10-04
Payer: COMMERCIAL

## 2018-10-04 PROCEDURE — 97140 MANUAL THERAPY 1/> REGIONS: CPT | Performed by: OCCUPATIONAL THERAPIST

## 2018-10-04 PROCEDURE — 97110 THERAPEUTIC EXERCISES: CPT | Performed by: OCCUPATIONAL THERAPIST

## 2018-10-04 PROCEDURE — 97018 PARAFFIN BATH THERAPY: CPT | Performed by: OCCUPATIONAL THERAPIST

## 2018-10-04 PROCEDURE — 97112 NEUROMUSCULAR REEDUCATION: CPT | Performed by: OCCUPATIONAL THERAPIST

## 2018-10-09 ENCOUNTER — APPOINTMENT (OUTPATIENT)
Dept: OCCUPATIONAL THERAPY | Age: 63
End: 2018-10-09
Payer: COMMERCIAL

## 2018-10-11 ENCOUNTER — HOSPITAL ENCOUNTER (OUTPATIENT)
Dept: OCCUPATIONAL THERAPY | Age: 63
Setting detail: THERAPIES SERIES
Discharge: HOME | End: 2018-10-11
Payer: COMMERCIAL

## 2018-10-11 PROCEDURE — 97018 PARAFFIN BATH THERAPY: CPT | Performed by: OCCUPATIONAL THERAPIST

## 2018-10-11 PROCEDURE — 97140 MANUAL THERAPY 1/> REGIONS: CPT | Performed by: OCCUPATIONAL THERAPIST

## 2018-10-11 PROCEDURE — 97110 THERAPEUTIC EXERCISES: CPT | Performed by: OCCUPATIONAL THERAPIST

## 2018-10-11 PROCEDURE — G8985 CARRY GOAL STATUS: HCPCS | Performed by: OCCUPATIONAL THERAPIST

## 2018-10-11 PROCEDURE — 97112 NEUROMUSCULAR REEDUCATION: CPT | Performed by: OCCUPATIONAL THERAPIST

## 2018-10-11 PROCEDURE — G8984 CARRY CURRENT STATUS: HCPCS | Performed by: OCCUPATIONAL THERAPIST

## 2018-10-11 NOTE — PLAN OF CARE
[id number], [address] [place] [address]  Phone: [phone]  Fax [phone]        Occupational Therapy 10th Visit Progress Note    Date: 10/11/2018        Patient Name:  Juve Xiong    :  1955  MRN: [id number]    Referring Physician: Referring Practitioner: Romaine Farris MD                                                               Medical Diagnosis Information:   Diagnosis: M18.11 (ICD-10-CM) - Arthritis of carpometacarpal [place]) joint of right thumb,                     Date of injury: gradual onset over the last 10 years  Date of Surgery/Type of procedure:   R THUMB Aia 16 ARTHROPLASTY 18                                   Insurance information:OT Insurance Information: 18 - [employer] - $0CP - $4000DED(MET) - 90/10% - 60PT/OT - NO AUTH   Comorbidities Affecting Functional Performance:             []Anxiety (F41.9)/Depression (F32.9)     []Diabetes Type 1(E10.65) or 2 (E11.65)           []Rheumatoid Arthritis (M05.9)  []Fibromyalgia (M79.7)  []Neuropathy(G60.9)  []Osteoarthritis(M19.91)  [x]None      []Other:     G-code: OT G-codes  Functional Assessment Tool Used: DASH  Score: 45%       Date of Patient follow up with Physician:  Preferred Language for Healthcare:   [x]English       []other:  Latex Allergy:  [x]NO      []YES  RESTRICTIONS/PRECAUTIONS:   NONE  Progress Note:          [x]  Yes                []  No                  Next due by : Visit #10        Visit # Insurance Allowable Requires auth   10    60    no:[]                  yes:[]    From 9/10/18 to 10/11/2018     OBJECTIVE:   Date:  Hand Dominance:     [x]  Right    [] Left 9/10/2018    9/21/18  10/2/18  10/11/18    Objective Measures:           PAIN 5/10     1/10 L 3/10 R   Quick DASH 68%     45%   Digits WFL         Thumb ROM MP 20  IP 45 MP 30  IP 40       Thumb opposition  R:2,3  L: R: 2       Thumb Radial/Palmar abd ROM R:  L: R: 55

## 2018-10-12 ENCOUNTER — APPOINTMENT (OUTPATIENT)
Dept: OCCUPATIONAL THERAPY | Age: 63
End: 2018-10-12
Payer: COMMERCIAL

## 2018-10-15 ENCOUNTER — HOSPITAL ENCOUNTER (OUTPATIENT)
Dept: OCCUPATIONAL THERAPY | Age: 63
Setting detail: THERAPIES SERIES
Discharge: HOME | End: 2018-10-15
Payer: COMMERCIAL

## 2018-10-15 PROCEDURE — 97018 PARAFFIN BATH THERAPY: CPT | Performed by: OCCUPATIONAL THERAPIST

## 2018-10-15 PROCEDURE — 97140 MANUAL THERAPY 1/> REGIONS: CPT | Performed by: OCCUPATIONAL THERAPIST

## 2018-10-15 PROCEDURE — G0283 ELEC STIM OTHER THAN WOUND: HCPCS | Performed by: OCCUPATIONAL THERAPIST

## 2018-10-15 PROCEDURE — 97110 THERAPEUTIC EXERCISES: CPT | Performed by: OCCUPATIONAL THERAPIST

## 2018-10-18 ENCOUNTER — APPOINTMENT (OUTPATIENT)
Dept: OCCUPATIONAL THERAPY | Age: 63
End: 2018-10-18
Payer: COMMERCIAL

## 2018-10-19 ENCOUNTER — HOSPITAL ENCOUNTER (OUTPATIENT)
Dept: OCCUPATIONAL THERAPY | Age: 63
Setting detail: THERAPIES SERIES
Discharge: HOME | End: 2018-10-19
Payer: COMMERCIAL

## 2018-10-19 ENCOUNTER — OFFICE VISIT (OUTPATIENT)
Dept: ORTHOPEDIC SURGERY | Age: 63
End: 2018-10-19

## 2018-10-19 VITALS — BODY MASS INDEX: 21.15 KG/M2 | WEIGHT: 123.9 LBS | HEIGHT: 64 IN

## 2018-10-19 DIAGNOSIS — M18.12 ARTHRITIS OF CARPOMETACARPAL (CMC) JOINT OF LEFT THUMB: Primary | ICD-10-CM

## 2018-10-19 DIAGNOSIS — M18.11 ARTHRITIS OF CARPOMETACARPAL (CMC) JOINT OF RIGHT THUMB: ICD-10-CM

## 2018-10-19 PROCEDURE — 97140 MANUAL THERAPY 1/> REGIONS: CPT | Performed by: OCCUPATIONAL THERAPIST

## 2018-10-19 PROCEDURE — 97035 APP MDLTY 1+ULTRASOUND EA 15: CPT | Performed by: OCCUPATIONAL THERAPIST

## 2018-10-19 PROCEDURE — G0283 ELEC STIM OTHER THAN WOUND: HCPCS | Performed by: OCCUPATIONAL THERAPIST

## 2018-10-19 PROCEDURE — 97018 PARAFFIN BATH THERAPY: CPT | Performed by: OCCUPATIONAL THERAPIST

## 2018-10-19 PROCEDURE — 99024 POSTOP FOLLOW-UP VISIT: CPT | Performed by: ORTHOPAEDIC SURGERY

## 2018-10-19 NOTE — PROGRESS NOTES
Chief Complaint:  Follow-up (R THUMB Aia 16 ARTHROPLASTY 8/6/18, L THUMB INJECTION 9/18/18)      History of Present of Illness: The patient is now just over 2 months out from her right thumb Aia 16 arthroplasty. At her last visit we did inject her left thumb which has also been quite bothersome. Her right thumb has felt a bit better after the injection, but she felt that she slid backwards a bit with her right thumb as it has been a bit more tender recently. Review of Systems  Pertinent items are noted in HPI  Denies fever, chills, confusion, bowel/bladder active change. Examination:  On exam today all incisions on the right side have healed nicely. She does have some tenderness near the surgical site but there is no gross instability or sign of infection. On the opposite left thumb she has a positive CMC grind consistent with the long-standing thumb CMC arthritis as well. Radiology:     X-rays obtained and reviewed in office:  Views    Location    Impression      No orders of the defined types were placed in this encounter. Impression:  Encounter Diagnoses   Name Primary?  Arthritis of carpometacarpal (CMC) joint of left thumb Yes    Arthritis of carpometacarpal (CMC) joint of right thumb          Treatment Plan:   Ultimately I think the patient is doing well but probably just slightly stepped over the boundaries of her comfortable healing process and needs to just allow her right thumb to calm down slightly during the healing course. I try to be mostly reassuring regarding the overall healing course. We talked about making certain that she does have a good neoprene thumb spica support which she is inclined order online, and we will go through paperwork so that perhaps in the new year she can schedule for an opposite side left thumb CMC arthroplasty. Please note that this transcription was created using voice recognition software.   Any errors are unintentional and may be due to voice

## 2018-10-19 NOTE — FLOWSHEET NOTE
Right    [] Left 9/10/2018    9/21/18  10/2/18  10/11/18    Objective Measures:        PAIN 5/10   1/10 L 3/10 R   Quick DASH 68%   45%   Digits WFL      Thumb ROM MP 20  IP 45 MP 30  IP 40     Thumb opposition  R:2,3  L: R: 2     Thumb Radial/Palmar abd ROM R:  L: R: 55     Wrist ROM Ext/Flex R:44/35  L: 54/46  R: 65/60   Rad/Uln dev ROM R:  L:      Forearm ROM  Sup/pron R:  L:      Elbow ROM Ext/flex R:  L:      Shoulder Flex  Shoulder Abd  Shoulder IR/ER        Edema in cm circumf. MCPJs R:  L:      Edema in cm circumf.   Wrist R:  L:       strength in lbs R:  L:  R: 21#  L: 65# R: 41#    Pinch Strengthin lbs: lat  R:  L:  R: 3#  L: 15# R: 6#   Pinch Strength in lbs:  3 point R:  L:         MMT:               Modalities: 9/10/18   9/14/18  9/17/18 9/21/18  9/24/18 9/27/18  10/2/18  10/4/18  10/11/18  10/15/18 10/19/18   Fluido 15' 15' 15' 15' 15' 15'    INF + HP 15' 15'   Para + HP L hand Same  same Same  same Same  B hand Para and HP  B hands  B hands  L hand -Para + HP ; L hand 10';  -US 50% @ 1.4 8' R hand-     Therapeutic Exercise & Activities:              AROM Wrist and hand  Wrist and thumb x 10 each   Wrist and thumb x 15 each     PROM thumb and wrist 5'  PROM wrist and thumb (added for home) PROM wrist and thumb  PROM wrist and thumb  Same      beans Grasp/release 5'        same    Para squeeze x 2' X 5' 5' 5' B  5' B  5' B 3' B     Sponge    Flex activity Same  same Same R R and pinch with red clip R With red clip   5'    Roll and  putty     5' yellow Same R  Same R added pinch today  5' R 5' B  6'    Power web       Red x 10 R        Flex bar       Red wrist flex ext x 20  Red x 20  Red x 20  Red x 20      digiflex        Added pinching to HEP 10/2/18  Red x 20  Green R x 20               Power web red x 10               Putty and         Therapeutic Exercise and NMR EXR  [x] ([id number]) Provided verbal/tactile cueing for activities related to strengthening, flexibility, endurance,
(HIGH) 23727       [] Anjel (21202) x      [] AAOKL(49034)  [] NMR (07193) x      [] Estim (attended) (04093)   [x] Manual ([id number]) x       [x] US (09935)  [] TA (77783) x      [x] Paraffin (36654)  [] ADL  (54542) x     [] Splint/L code:    [x] Estim (unattended) (20123)  [] Other:  [] Fluidotherapy (81172)  [](04209) Checkout for orthotic use, established patient x       [] (98241) Orthotic mgmt & training  x        GOALS:  Short Term Goals: To be achieved in: 2 weeks  1. Independent in HEP and progression per patient tolerance, in order to prevent re-injury. 2. Patient will have a decrease in pain to facilitate improvement in movement, function, and ADLs as indicated by Functional Deficits. Long Term Goals to be achieved in 8  weeks, including patient directed goals to address identified performance deficits:  1) Pt to be independent in graded HEP progression with a good level of effort and compliance. 2) Pt to report a score of 68 % or less on the Quick DASH disability questionnaire for increased performance with carrying, moving, and handling objects. 3) Pt will demonstrate increased ROM to [facility] for improved performance with self care activities, driving, vocational tasks, house hold chores, and fine object manipulation  4) Pt will demonstrate increased  strength to at least  25# for improved performance with self care activities, driving, vocational tasks, house hold chores, and fine object manipulation    5) Pt will have a decrease in pain to 2/10 or less to facilitate improvement in performance with self care activities, driving, vocational tasks, house hold chores, and fine object manipulation    Progression Towards Functional goals:  [x] Patient is progressing as expected towards functional goals listed. [] Progression is slowed due to complexities listed. [] Progression has been slowed due to co-morbidities.   [] Plan just implemented, too soon to assess goals progression  [] Other:

## 2018-10-22 ENCOUNTER — HOSPITAL ENCOUNTER (OUTPATIENT)
Dept: OCCUPATIONAL THERAPY | Age: 63
Setting detail: THERAPIES SERIES
Discharge: HOME | End: 2018-10-22
Payer: COMMERCIAL

## 2018-10-22 PROCEDURE — 97140 MANUAL THERAPY 1/> REGIONS: CPT | Performed by: OCCUPATIONAL THERAPIST

## 2018-10-22 PROCEDURE — 97018 PARAFFIN BATH THERAPY: CPT | Performed by: OCCUPATIONAL THERAPIST

## 2018-10-22 PROCEDURE — 97035 APP MDLTY 1+ULTRASOUND EA 15: CPT | Performed by: OCCUPATIONAL THERAPIST

## 2018-10-22 PROCEDURE — 97110 THERAPEUTIC EXERCISES: CPT | Performed by: OCCUPATIONAL THERAPIST

## 2018-10-22 PROCEDURE — 97022 WHIRLPOOL THERAPY: CPT | Performed by: OCCUPATIONAL THERAPIST

## 2018-10-22 NOTE — FLOWSHEET NOTE
[facility], [address] [place] [address]  Phone: [phone]  Fax [phone]  Hand Therapy Daily Treatment Note  Date:  10/22/2018    Patient: Betty Jean   : 1955   MRN: [id number]  Referring Physician: Referring Practitioner: Reggie Espinoza MD       Medical Diagnosis Information:   Diagnosis: M18.11 (ICD-10-CM) - Arthritis of carpometacarpal [place]) joint of right thumb,          Date of injury: gradual onset over the last 10 years  Date of Surgery/Type of procedure:   [facility]                                   Insurance information:OT Insurance Information: 18 - UMR - $0CP - $4000DED(MET) - 90/10% - 60PT/OT - NO AUTH   Comorbidities Affecting Functional Performance:     []Anxiety (F41.9)/Depression (F32.9)   []Diabetes Type 1(E10.65) or 2 (E11.65)   []Rheumatoid Arthritis (M05.9)  []Fibromyalgia (M79.7)  []Neuropathy(G60.9)  []Osteoarthritis(M19.91)  [x]None   []Other:    G-code: OT G-codes  Functional Assessment Tool Used: DASH  Score: 68%  Carrying, Moving and Handling Objects Current Status (): At least 60 percent but less than 80 percent impaired, limited or restricted  Carrying, Moving and Handling Objects Goal Status ():  At least 20 percent but less than 40 percent impaired, limited or restricted    Date of Patient follow up with Physician:  Preferred Language for Healthcare:   [x]English       []other:  Latex Allergy:  [x]NO      []YES  RESTRICTIONS/PRECAUTIONS:   NONE  Progress Note: [x]  Yes  []  No  Next due by : Visit #10       Visit # Insurance Allowable Requires auth   13   60    no:[]                  yes:[]    From 9/10/18 to 10/22/2018    Pain level: 310 R hand  1/10 L thumb with use      SUBJECTIVE:  Pt reports that [address] appeared to really help with pain mgt    OBJECTIVE:   Date:  Hand Dominance:     [x]  Right    [] Left 9/10/2018    9/21/18  10/2/18  10/11/18

## 2018-10-25 ENCOUNTER — HOSPITAL ENCOUNTER (OUTPATIENT)
Dept: OCCUPATIONAL THERAPY | Age: 63
Setting detail: THERAPIES SERIES
Discharge: HOME | End: 2018-10-25
Payer: COMMERCIAL

## 2018-10-25 PROCEDURE — 97140 MANUAL THERAPY 1/> REGIONS: CPT | Performed by: OCCUPATIONAL THERAPIST

## 2018-10-25 PROCEDURE — 97018 PARAFFIN BATH THERAPY: CPT | Performed by: OCCUPATIONAL THERAPIST

## 2018-10-25 PROCEDURE — 97035 APP MDLTY 1+ULTRASOUND EA 15: CPT | Performed by: OCCUPATIONAL THERAPIST

## 2018-10-25 PROCEDURE — 97110 THERAPEUTIC EXERCISES: CPT | Performed by: OCCUPATIONAL THERAPIST

## 2018-10-25 NOTE — FLOWSHEET NOTE
Right    [] Left 9/10/2018    9/21/18  10/2/18  10/11/18    Objective Measures:        PAIN 5/10   1/10 L 3/10 R   Quick DASH 68%   45%   Digits WFL      Thumb ROM MP 20  IP 45 MP 30  IP 40     Thumb opposition  R:2,3  L: R: 2     Thumb Radial/Palmar abd ROM R:  L: R: 55     Wrist ROM Ext/Flex R:44/35  L: 54/46  R: 65/60   Rad/Uln dev ROM R:  L:      Forearm ROM  Sup/pron R:  L:      Elbow ROM Ext/flex R:  L:      Shoulder Flex  Shoulder Abd  Shoulder IR/ER        Edema in cm circumf. MCPJs R:  L:      Edema in cm circumf.   Wrist R:  L:       strength in lbs R:  L:  R: 21#  L: 65# R: 41#    Pinch Strengthin lbs: lat  R:  L:  R: 3#  L: 15# R: 6#   Pinch Strength in lbs:  3 point R:  L:         MMT:               Modalities: 9/10/18   9/14/18  9/17/18 9/21/18  9/24/18 9/27/18  10/2/18  10/4/18  10/11/18  10/15/18 10/19/18 10/22/18 10/25/18    Fluido 15' 15' 15' 15' 15' 15'    INF + HP 15' 15' 15'    Para + HP L hand Same  same Same  same Same  B hand Para and HP  B hands  B hands  L hand -Para + HP ; L hand 10';  -US 50% @ 1.4 8' R hand-   Para + HP    US 8' HP and para     US x 8'     Therapeutic Exercise & Activities:                AROM Wrist and hand  Wrist and thumb x 10 each   Wrist and thumb x 15 each     PROM thumb and wrist 5'  PROM wrist and thumb (added for home) PROM wrist and thumb  PROM wrist and thumb  Same     Same    beans Grasp/release 5'        same      Para squeeze x 2' X 5' 5' 5' B  5' B  5' B 3' B    3'   Sponge    Flex activity Same  same Same R R and pinch with red clip R With red clip   5'      Roll and  putty     5' yellow Same R  Same R added pinch today  5' R 5' B  6'   5' B     Gentle pinch x 10 each    Power web       Red x 10 R       Red x 10 B    Flex bar       Red wrist flex ext x 20  Red x 20  Red x 20  Red x 20     Red x 20 wrist flex ext    digiflex        Added pinching to HEP 10/2/18  Red x 20  Green R x 20                 Power web red x 10                 Putty and           Therapeutic Exercise and NMR EXR  [x] (97415) Provided verbal/tactile cueing for activities related to strengthening, flexibility, endurance, ROM  for improvements in scapular, scapulothoracic and UE control with self care, reaching, carrying, lifting, house/yardwork, driving/computer work.    [] (65779) Provided verbal/tactile cueing for activities related to improving balance, coordination, kinesthetic sense, posture, motor skill, proprioception  to assist with  scapular, scapulothoracic and UE control with self care, reaching, carrying, lifting, house/yardwork, driving/computer work. Therapeutic Activities:    [] ( 00748) therapeutic activities, direct (one on one) patient contact. Use of dynamic activities to improve functional performance.     Activities of Daily Living:  [] (46841) Provided self-care/home management training (i.e., activities of daily living and compensatory training, meal preparation, safety procedures, and instructions in use of assistive technology devices/adaptive equipment)     Home Exercise Program:  AROM in media   [x] (94993) Reviewed/Progressed HEP activities related to strengthening, flexibility, endurance, ROM of scapular, scapulothoracic and UE control with self care, reaching, carrying, lifting, house/yardwork, driving/computer work    Manual Treatments:  STM R thumb and scar mass  DTM L 10'  [x] (27547) Provided manual therapy to mobilize soft tissue/joints of the UE for the purpose of modulating pain, promoting relaxation,  increasing ROM, reducing/eliminating soft tissue swelling/inflammation/restriction, improving soft tissue extensibility and allowing for proper ROM for normal function with self care, reaching, carrying, lifting, house/yardwork, driving/computer work    Splinting:  [] Fabrication of: L-code  [] (58214) Checkout for orthotic/prosthetic use, established patient   [] (86949) Orthotic management and training (fitting and assessment)  [] Comments:    Charges:  Timed Code Treatment Minutes: 38   Total Treatment Minutes: 48   [] EVAL (LOW) 95054   [] OT Re-eval (08362)  [] EVAL (MOD) 00923   [] EVAL (HIGH) 31508       [x] Anjel (27802) x      [] TEVGP(83783)  [] NMR (80440) x      [] Estim (attended) (27117)   [x] Manual (86077) x       [x] MQ (07804)  [] TA (37830) x      [x] Paraffin (77558)  [] ADL  (87366) x     [] Splint/L code:    [] Estim (unattended) (94782)  [] Other:  [] Fluidotherapy (63569)  [](19112) Checkout for orthotic use, established patient x       [] (32441) Orthotic mgmt & training  x        GOALS:  Short Term Goals: To be achieved in: 2 weeks  1. Independent in HEP and progression per patient tolerance, in order to prevent re-injury. 2. Patient will have a decrease in pain to facilitate improvement in movement, function, and ADLs as indicated by Functional Deficits. Long Term Goals to be achieved in 8  weeks, including patient directed goals to address identified performance deficits:  1) Pt to be independent in graded HEP progression with a good level of effort and compliance. 2) Pt to report a score of 68 % or less on the Quick DASH disability questionnaire for increased performance with carrying, moving, and handling objects. 3) Pt will demonstrate increased ROM to [facility] for improved performance with self care activities, driving, vocational tasks, house hold chores, and fine object manipulation  4) Pt will demonstrate increased  strength to at least  25# for improved performance with self care activities, driving, vocational tasks, house hold chores, and fine object manipulation    5) Pt will have a decrease in pain to 2/10 or less to facilitate improvement in performance with self care activities, driving, vocational tasks, house hold chores, and fine object manipulation    Progression Towards Functional goals:  [x] Patient is progressing as expected towards functional goals listed.     [] Progression is slowed due to complexities listed. [] Progression has been slowed due to co-morbidities.   [] Plan just implemented, too soon to assess goals progression  [] Other:     ASSESSMENT:  Pt is progressing with pain mgt  Treatment/Activity Tolerance:  [x] Patient tolerated treatment well [] Patient limited by fatique  [] Patient limited by pain  [] Patient limited by other medical complications  [] Other:     Prognosis: [x] Good [] Fair  [] Poor    Patient Requires Follow-up: [x] Yes  [] No    PLAN: Recommend Occupational Therapy 1-2 times a week for 8 weeks  [x] Continue per plan of care [] Alter current plan (see comments)  [] Plan of care initiated [] Hold pending MD visit [] Discharge    Plan for next session:reintroduce light resistive exercise   Thermal modalities, functional activities and strengthening as inicated, AROM, PROM as indicated    Electronically signed by: Marylin Aponte OTR/L [id number]

## 2018-10-29 ENCOUNTER — HOSPITAL ENCOUNTER (OUTPATIENT)
Dept: OCCUPATIONAL THERAPY | Age: 63
Setting detail: THERAPIES SERIES
Discharge: HOME | End: 2018-10-29
Payer: COMMERCIAL

## 2018-10-29 PROCEDURE — 97140 MANUAL THERAPY 1/> REGIONS: CPT | Performed by: OCCUPATIONAL THERAPIST

## 2018-10-29 PROCEDURE — 97035 APP MDLTY 1+ULTRASOUND EA 15: CPT | Performed by: OCCUPATIONAL THERAPIST

## 2018-10-29 PROCEDURE — 97110 THERAPEUTIC EXERCISES: CPT | Performed by: OCCUPATIONAL THERAPIST

## 2018-10-29 PROCEDURE — 97112 NEUROMUSCULAR REEDUCATION: CPT | Performed by: OCCUPATIONAL THERAPIST

## 2018-10-29 PROCEDURE — 97018 PARAFFIN BATH THERAPY: CPT | Performed by: OCCUPATIONAL THERAPIST

## 2018-10-29 NOTE — FLOWSHEET NOTE
Dominance:     [x]  Right    [] Left 9/10/2018    9/21/18  10/2/18  10/11/18    Objective Measures:        PAIN 5/10   1/10 L 3/10 R   Quick DASH 68%   45%   Digits WFL      Thumb ROM MP 20  IP 45 MP 30  IP 40     Thumb opposition  R:2,3  L: R: 2     Thumb Radial/Palmar abd ROM R:  L: R: 55     Wrist ROM Ext/Flex R:44/35  L: 54/46  R: 65/60   Rad/Uln dev ROM R:  L:      Forearm ROM  Sup/pron R:  L:      Elbow ROM Ext/flex R:  L:      Shoulder Flex  Shoulder Abd  Shoulder IR/ER        Edema in cm circumf. MCPJs R:  L:      Edema in cm circumf.   Wrist R:  L:       strength in lbs R:  L:  R: 21#  L: 65# R: 41#    Pinch Strengthin lbs: lat  R:  L:  R: 3#  L: 15# R: 6#   Pinch Strength in lbs:  3 point R:  L:         MMT:               Modalities: 9/10/18   9/14/18  9/17/18 9/21/18  9/24/18 9/27/18  10/2/18  10/4/18  10/11/18  10/15/18 10/19/18 10/22/18 10/25/18  10/29/18   Fluido 15' 15' 15' 15' 15' 15'    INF + HP 15' 15' 15'     Para + HP L hand Same  same Same  same Same  B hand Para and HP  B hands  B hands  L hand -Para + HP ; L hand 10';  -US 50% @ 1.4 8' R hand-   Para + HP    US 8' HP and para     US x 8'   10'    8'   Therapeutic Exercise & Activities:                 AROM Wrist and hand  Wrist and thumb x 10 each   Wrist and thumb x 15 each     PROM thumb and wrist 5'  PROM wrist and thumb (added for home) PROM wrist and thumb  PROM wrist and thumb  Same     Same     beans Grasp/release 5'        same       Para squeeze x 2' X 5' 5' 5' B  5' B  5' B 3' B    3'    Sponge    Flex activity Same  same Same R R and pinch with red clip R With red clip   5'    Sponge  with yellow clip   Roll and  putty     5' yellow Same R  Same R added pinch today  5' R 5' B  6'   5' B     Gentle pinch x 10 each  Yellow  Pinching, rolling, mallet, coin twist, IP flex push   Power web       Red x 10 R       Red x 10 B     Flex bar       Red wrist flex ext x 20  Red x 20  Red x 20  Red x 20     Red x 20 wrist manipulation    Progression Towards Functional goals:  [x] Patient is progressing as expected towards functional goals listed. [] Progression is slowed due to complexities listed. [] Progression has been slowed due to co-morbidities.   [] Plan just implemented, too soon to assess goals progression  [] Other:     ASSESSMENT:  Pt is progressing with tolerance for light resistive activity  Treatment/Activity Tolerance:  [x] Patient tolerated treatment well [] Patient limited by fatique  [] Patient limited by pain  [] Patient limited by other medical complications  [] Other:     Prognosis: [x] Good [] Fair  [] Poor    Patient Requires Follow-up: [x] Yes  [] No    PLAN: Recommend Occupational Therapy 1-2 times a week for 8 weeks  [x] Continue per plan of care [] Alter current plan (see comments)  [] Plan of care initiated [] Hold pending MD visit [] Discharge    Plan for next session:reintroduce light resistive exercise   Thermal modalities, functional activities and strengthening as inicated, AROM, PROM as indicated    Electronically signed by: Ciaran Calle OT/L [id number]

## 2018-11-01 ENCOUNTER — HOSPITAL ENCOUNTER (OUTPATIENT)
Dept: OCCUPATIONAL THERAPY | Age: 63
Setting detail: THERAPIES SERIES
Discharge: HOME | End: 2018-11-01
Payer: COMMERCIAL

## 2018-11-01 PROCEDURE — 97022 WHIRLPOOL THERAPY: CPT | Performed by: OCCUPATIONAL THERAPIST

## 2018-11-01 PROCEDURE — 97018 PARAFFIN BATH THERAPY: CPT | Performed by: OCCUPATIONAL THERAPIST

## 2018-11-01 PROCEDURE — 97140 MANUAL THERAPY 1/> REGIONS: CPT | Performed by: OCCUPATIONAL THERAPIST

## 2018-11-01 PROCEDURE — 97110 THERAPEUTIC EXERCISES: CPT | Performed by: OCCUPATIONAL THERAPIST

## 2018-11-01 NOTE — FLOWSHEET NOTE
[facility], [address] [place] [address]  Phone: [phone]  Fax [phone]  Hand Therapy Daily Treatment Note  Date:  2018    Patient: Leonel Hannah   : 1955   MRN: [id number]  Referring Physician: Referring Practitioner: Angelina Vale MD       Medical Diagnosis Information:   Diagnosis: M18.11 (ICD-10-CM) - Arthritis of carpometacarpal [place]) joint of right thumb,          Date of injury: gradual onset over the last 10 years  Date of Surgery/Type of procedure:   [facility]                                   Insurance information:OT Insurance Information: 18 - UMR - $0CP - $4000DED(MET) - 90/10% - 60PT/OT - NO AUTH   Comorbidities Affecting Functional Performance:     []Anxiety (F41.9)/Depression (F32.9)   []Diabetes Type 1(E10.65) or 2 (E11.65)   []Rheumatoid Arthritis (M05.9)  []Fibromyalgia (M79.7)  []Neuropathy(G60.9)  []Osteoarthritis(M19.91)  [x]None   []Other:    G-code: OT G-codes  Functional Assessment Tool Used: DASH  Score: 68%  Carrying, Moving and Handling Objects Current Status (): At least 60 percent but less than 80 percent impaired, limited or restricted  Carrying, Moving and Handling Objects Goal Status (): At least 20 percent but less than 40 percent impaired, limited or restricted    Date of Patient follow up with Physician:  Preferred Language for Healthcare:   [x]English       []other:  Latex Allergy:  [x]NO      []YES  RESTRICTIONS/PRECAUTIONS:   NONE  Progress Note: [x]  Yes  []  No  Next due by : Visit #10       Visit # Insurance Allowable Requires auth   16   60    no:[]                  yes:[]    From 9/10/18 to 2018    Pain level: 1-2/10 R hand  1/10 L thumb with use      SUBJECTIVE:  It gets flared up when I do too much but it is slowly getting better.     OBJECTIVE:   Date:  Hand Dominance:     [x]  Right    [] Left 9/10/2018    9/21/18 Red wrist flex ext x 20  Red x 20  Red x 20  Red x 20     Red x 20 wrist flex ext   Red x 20    digiflex        Added pinching to HEP 10/2/18  Red x 20  Green R x 20       Red x 20 x 2 R            Power web red x 10                   Putty and             Therapeutic Exercise and NMR EXR  [x] (65328) Provided verbal/tactile cueing for activities related to strengthening, flexibility, endurance, ROM  for improvements in scapular, scapulothoracic and UE control with self care, reaching, carrying, lifting, house/yardwork, driving/computer work.    [] (12071) Provided verbal/tactile cueing for activities related to improving balance, coordination, kinesthetic sense, posture, motor skill, proprioception  to assist with  scapular, scapulothoracic and UE control with self care, reaching, carrying, lifting, house/yardwork, driving/computer work. Therapeutic Activities:    [] ( 83274) therapeutic activities, direct (one on one) patient contact. Use of dynamic activities to improve functional performance.     Activities of Daily Living:  [] (18031) Provided self-care/home management training (i.e., activities of daily living and compensatory training, meal preparation, safety procedures, and instructions in use of assistive technology devices/adaptive equipment)     Home Exercise Program:  AROM in media   [x] (46907) Reviewed/Progressed HEP activities related to strengthening, flexibility, endurance, ROM of scapular, scapulothoracic and UE control with self care, reaching, carrying, lifting, house/yardwork, driving/computer work    Manual Treatments:  STM R thumb and scar mass  DTM L 10'  [x] (34588) Provided manual therapy to mobilize soft tissue/joints of the UE for the purpose of modulating pain, promoting relaxation,  increasing ROM, reducing/eliminating soft tissue swelling/inflammation/restriction, improving soft tissue extensibility and allowing for proper ROM for normal function with self care, reaching, carrying, lifting, house/yardwork, driving/computer work    Splinting:  [] Fabrication of: L-code  [] (86187) Checkout for orthotic/prosthetic use, established patient   [] (19158) Orthotic management and training (fitting and assessment)  [] Comments:    Charges:  Timed Code Treatment Minutes: 30   Total Treatment Minutes: 50   [] EVAL (LOW) 25402   [] OT Re-eval (30420)  [] EVAL (MOD) 25734   [] EVAL (HIGH) 55118       [x] Anjel (58027) x      [] NIXEI(24505)  [] NMR (35142) x      [] Estim (attended) (46292)   [x] Manual ([id number]) x       [] US (91569)  [] TA (01832) x      [x] Paraffin (98311)  [] ADL  (15699) x     [] Splint/L code:    [] Estim (unattended) (68882)  [] Other:  [x] Fluidotherapy (74647)  [](17545) Checkout for orthotic use, established patient x       [] (72629) Orthotic mgmt & training  x        GOALS:  Short Term Goals: To be achieved in: 2 weeks  1. Independent in HEP and progression per patient tolerance, in order to prevent re-injury. 2. Patient will have a decrease in pain to facilitate improvement in movement, function, and ADLs as indicated by Functional Deficits. Long Term Goals to be achieved in 8  weeks, including patient directed goals to address identified performance deficits:  1) Pt to be independent in graded HEP progression with a good level of effort and compliance. 2) Pt to report a score of 68 % or less on the Quick DASH disability questionnaire for increased performance with carrying, moving, and handling objects.   3) Pt will demonstrate increased ROM to [facility] for improved performance with self care activities, driving, vocational tasks, house hold chores, and fine object manipulation  4) Pt will demonstrate increased  strength to at least  25# for improved performance with self care activities, driving, vocational tasks, house hold chores, and fine object manipulation    5) Pt will have a decrease in pain to 2/10 or less to facilitate improvement in performance with self care activities, driving, vocational tasks, house hold chores, and fine object manipulation    Progression Towards Functional goals:  [x] Patient is progressing as expected towards functional goals listed. [] Progression is slowed due to complexities listed. [] Progression has been slowed due to co-morbidities.   [] Plan just implemented, too soon to assess goals progression  [] Other:     ASSESSMENT:  Pt is progressing with tolerance for light resistive activity  Treatment/Activity Tolerance:  [x] Patient tolerated treatment well [] Patient limited by fatique  [] Patient limited by pain  [] Patient limited by other medical complications  [] Other:     Prognosis: [x] Good [] Fair  [] Poor    Patient Requires Follow-up: [x] Yes  [] No    PLAN: Recommend Occupational Therapy 1-2 times a week for 8 weeks  [x] Continue per plan of care [] Alter current plan (see comments)  [] Plan of care initiated [] Hold pending MD visit [] Discharge    Plan for next session:reintroduce light resistive exercise   Thermal modalities, functional activities and strengthening as inicated, AROM, PROM as indicated    Electronically signed by: Yovanny García OTR/L [id number]

## 2018-11-12 ENCOUNTER — HOSPITAL ENCOUNTER (OUTPATIENT)
Dept: OCCUPATIONAL THERAPY | Age: 63
Setting detail: THERAPIES SERIES
Discharge: HOME | End: 2018-11-12
Payer: COMMERCIAL

## 2018-11-12 PROCEDURE — 97110 THERAPEUTIC EXERCISES: CPT | Performed by: OCCUPATIONAL THERAPIST

## 2018-11-12 PROCEDURE — 97140 MANUAL THERAPY 1/> REGIONS: CPT | Performed by: OCCUPATIONAL THERAPIST

## 2018-11-12 PROCEDURE — 97035 APP MDLTY 1+ULTRASOUND EA 15: CPT | Performed by: OCCUPATIONAL THERAPIST

## 2018-11-12 NOTE — FLOWSHEET NOTE
[facility], [address]  Phone: [phone]  Fax [phone]  Hand Therapy Daily Treatment Note  Date:  2018    Patient: Marc Null   : 1955   MRN: [id number]  Referring Physician: Referring Practitioner: Estefania Montalvo MD       Medical Diagnosis Information:   Diagnosis: M18.11 (ICD-10-CM) - Arthritis of carpometacarpal [place]) joint of right thumb,          Date of injury: gradual onset over the last 10 years  Date of Surgery/Type of procedure:   R THUMB Aia 16 ARTHROPLASTY 18                                   Insurance information:OT Insurance Information: 18 - UMR - $0CP - $4000DED(MET) - 90/10% - 60PT/OT - NO AUTH   Comorbidities Affecting Functional Performance:     []Anxiety (F41.9)/Depression (F32.9)   []Diabetes Type 1(E10.65) or 2 (E11.65)   []Rheumatoid Arthritis (M05.9)  []Fibromyalgia (M79.7)  []Neuropathy(G60.9)  []Osteoarthritis(M19.91)  [x]None   []Other:    G-code: OT G-codes  Functional Assessment Tool Used: DASH  Score: 68%  Carrying, Moving and Handling Objects Current Status (): At least 60 percent but less than 80 percent impaired, limited or restricted  Carrying, Moving and Handling Objects Goal Status (): At least 20 percent but less than 40 percent impaired, limited or restricted    Date of Patient follow up with Physician:  Preferred Language for Healthcare:   [x]English       []other:  Latex Allergy:  [x]NO      []YES  RESTRICTIONS/PRECAUTIONS:   NONE  Progress Note: [x]  Yes  []  No  Next due by : Visit #10       Visit # Insurance Allowable Requires auth   17   60    no:[]                  yes:[]    From 9/10/18 to 2018    Pain level: 1-2/10 R hand  1/10 L thumb with use      Subjective: \"My R thumb is feeling a lot better!  I can open medicine bottles and jar lids    OBJECTIVE:   Date:  Hand Dominance:     [x]  Right    [] Left 9/10/2018    9/21/18  10/2/18  10/11/18  11/1/18    Objective Measures:         PAIN 5/10   1/10 L 3/10 R    Quick DASH 68%   45%    Digits WFL       Thumb ROM MP 20  IP 45 MP 30  IP 40      Thumb opposition  R:2,3  L: R: 2      Thumb Radial/Palmar abd ROM R:  L: R: 55      Wrist ROM Ext/Flex R:44/35  L: 54/46  R: 65/60    Rad/Uln dev ROM R:  L:       Forearm ROM  Sup/pron R:  L:       Elbow ROM Ext/flex R:  L:       Shoulder Flex  Shoulder Abd  Shoulder IR/ER         Edema in cm circumf. MCPJs R:  L:       Edema in cm circumf.   Wrist R:  L:        strength in lbs R:  L:  R: 21#  L: 65# R: 41#  R: 41#  L: 61#   Pinch Strengthin lbs: lat  R:  L:  R: 3#  L: 15# R: 6#    Pinch Strength in lbs:  3 point R:  L:          MMT:                Modalities: 10/19/18 10/22/18 10/25/18  10/29/18 11/1/18  11/12/18   Fluido 15' 15'       Para + HP -Para + HP ; L hand 10';  -US 50% @ 1.4 8' R hand-   Para + HP    US 8' HP and para     US x 8'   10'    8' HP and para 10' B hands     fluido R hand  US 50% @ 1.6 8' R and L   Therapeutic Exercise & Activities:         AROM   Same   Same  5'   beans         Para squeeze   3'  3'    Sponge     Sponge  with yellow clip     Roll and  putty    5' B     Gentle pinch x 10 each  Yellow  Pinching, rolling, mallet, coin twist, IP flex push 5' B pinch  and roll  8'   Power web    Red x 10 B       Flex bar    Red x 20 wrist flex ext   Red x 20     digiflex      Red x 20 x 2 R                        Therapeutic Exercise and NMR EXR  [x] (14006) Provided verbal/tactile cueing for activities related to strengthening, flexibility, endurance, ROM  for improvements in scapular, scapulothoracic and UE control with self care, reaching, carrying, lifting, house/yardwork, driving/computer work.    [] (80623) Provided verbal/tactile cueing for activities related to improving balance, coordination, kinesthetic sense, posture, motor skill, proprioception  to assist with  scapular, scapulothoracic and UE control with self care, reaching, carrying, lifting, house/yardwork, driving/computer work. Therapeutic Activities:    [] ( 41029) therapeutic activities, direct (one on one) patient contact. Use of dynamic activities to improve functional performance.     Activities of Daily Living:  [] (99346) Provided self-care/home management training (i.e., activities of daily living and compensatory training, meal preparation, safety procedures, and instructions in use of assistive technology devices/adaptive equipment)     Home Exercise Program:  AROM in media   [x] (26097) Reviewed/Progressed HEP activities related to strengthening, flexibility, endurance, ROM of scapular, scapulothoracic and UE control with self care, reaching, carrying, lifting, house/yardwork, driving/computer work    Manual Treatments:  STM R thumb and scar mass  DTM L 10'  [x] (49440) Provided manual therapy to mobilize soft tissue/joints of the UE for the purpose of modulating pain, promoting relaxation,  increasing ROM, reducing/eliminating soft tissue swelling/inflammation/restriction, improving soft tissue extensibility and allowing for proper ROM for normal function with self care, reaching, carrying, lifting, house/yardwork, driving/computer work    Splinting:  [] Fabrication of: L-code  [] (05107) Checkout for orthotic/prosthetic use, established patient   [] (51540) Orthotic management and training (fitting and assessment)  [] Comments:    Charges:  Timed Code Treatment Minutes: 50   Total Treatment Minutes: 50   [] EVAL (LOW) 02462   [] OT Re-eval (76952)  [] EVAL (MOD) 94443   [] EVAL (HIGH) 76559       [x] Anjel (12914) x      [] FHRUK(48151)  [] NMR (92416) x      [] Estim (attended) (18090)   [x] Manual ([id number]) x       [x] US (53923)  [] TA (12306) x      [] Paraffin (45149)  [] ADL  (22 [phone] 60) x     [] Splint/L code:    [] Estim (unattended) (29734)  [] Other:  [] Maria E (98656)  [](84511) Checkout for orthotic use, established patient x       [] (90645) Orthotic mgmt & training  x        GOALS:  Short Term Goals: To be achieved in: 2 weeks  1. Independent in HEP and progression per patient tolerance, in order to prevent re-injury. 2. Patient will have a decrease in pain to facilitate improvement in movement, function, and ADLs as indicated by Functional Deficits. Long Term Goals to be achieved in 8  weeks, including patient directed goals to address identified performance deficits:  1) Pt to be independent in graded HEP progression with a good level of effort and compliance. 2) Pt to report a score of 68 % or less on the Quick DASH disability questionnaire for increased performance with carrying, moving, and handling objects. 3) Pt will demonstrate increased ROM to [facility] for improved performance with self care activities, driving, vocational tasks, house hold chores, and fine object manipulation  4) Pt will demonstrate increased  strength to at least  25# for improved performance with self care activities, driving, vocational tasks, house hold chores, and fine object manipulation    5) Pt will have a decrease in pain to 2/10 or less to facilitate improvement in performance with self care activities, driving, vocational tasks, house hold chores, and fine object manipulation    Progression Towards Functional goals:  [x] Patient is progressing as expected towards functional goals listed. [] Progression is slowed due to complexities listed. [] Progression has been slowed due to co-morbidities.   [] Plan just implemented, too soon to assess goals progression  [] Other:     ASSESSMENT:  Pt is progressing with tolerance for light resistive activity  Treatment/Activity Tolerance:  [x] Patient tolerated treatment well [] Patient limited by fatique  [] Patient limited by pain  [] Patient limited by other medical complications  [] Other:     Prognosis: [x] Good [] Fair  [] Poor    Patient Requires Follow-up: [x] Yes  []

## 2018-11-15 ENCOUNTER — HOSPITAL ENCOUNTER (OUTPATIENT)
Dept: OCCUPATIONAL THERAPY | Age: 63
Setting detail: THERAPIES SERIES
End: 2018-11-15
Payer: COMMERCIAL

## 2018-11-19 ENCOUNTER — APPOINTMENT (OUTPATIENT)
Dept: OCCUPATIONAL THERAPY | Age: 63
End: 2018-11-19
Payer: COMMERCIAL

## 2018-11-20 ENCOUNTER — HOSPITAL ENCOUNTER (OUTPATIENT)
Dept: OCCUPATIONAL THERAPY | Age: 63
Setting detail: THERAPIES SERIES
Discharge: HOME | End: 2018-11-20
Payer: COMMERCIAL

## 2018-11-20 PROCEDURE — 97140 MANUAL THERAPY 1/> REGIONS: CPT | Performed by: OCCUPATIONAL THERAPIST

## 2018-11-20 PROCEDURE — 97110 THERAPEUTIC EXERCISES: CPT | Performed by: OCCUPATIONAL THERAPIST

## 2018-11-20 PROCEDURE — 97018 PARAFFIN BATH THERAPY: CPT | Performed by: OCCUPATIONAL THERAPIST

## 2018-11-23 ENCOUNTER — APPOINTMENT (OUTPATIENT)
Dept: OCCUPATIONAL THERAPY | Age: 63
End: 2018-11-23
Payer: COMMERCIAL

## 2018-11-29 ENCOUNTER — HOSPITAL ENCOUNTER (OUTPATIENT)
Dept: OCCUPATIONAL THERAPY | Age: 63
Setting detail: THERAPIES SERIES
Discharge: HOME | End: 2018-11-29
Payer: COMMERCIAL

## 2018-11-29 PROCEDURE — 97110 THERAPEUTIC EXERCISES: CPT | Performed by: OCCUPATIONAL THERAPIST

## 2018-11-29 PROCEDURE — 97140 MANUAL THERAPY 1/> REGIONS: CPT | Performed by: OCCUPATIONAL THERAPIST

## 2018-11-29 PROCEDURE — 97018 PARAFFIN BATH THERAPY: CPT | Performed by: OCCUPATIONAL THERAPIST

## 2018-11-29 NOTE — FLOWSHEET NOTE
carrying, lifting, house/yardwork, driving/computer work.    [] (97992) Provided verbal/tactile cueing for activities related to improving balance, coordination, kinesthetic sense, posture, motor skill, proprioception  to assist with  scapular, scapulothoracic and UE control with self care, reaching, carrying, lifting, house/yardwork, driving/computer work. Therapeutic Activities:    [] ( 05563) therapeutic activities, direct (one on one) patient contact. Use of dynamic activities to improve functional performance.     Activities of Daily Living:  [] (74320) Provided self-care/home management training (i.e., activities of daily living and compensatory training, meal preparation, safety procedures, and instructions in use of assistive technology devices/adaptive equipment)     Home Exercise Program:  AROM in media   [x] (37164) Reviewed/Progressed HEP activities related to strengthening, flexibility, endurance, ROM of scapular, scapulothoracic and UE control with self care, reaching, carrying, lifting, house/yardwork, driving/computer work    Manual Treatments:  STM R thumb and scar mass  DTM L 10'  [x] (31488) Provided manual therapy to mobilize soft tissue/joints of the UE for the purpose of modulating pain, promoting relaxation,  increasing ROM, reducing/eliminating soft tissue swelling/inflammation/restriction, improving soft tissue extensibility and allowing for proper ROM for normal function with self care, reaching, carrying, lifting, house/yardwork, driving/computer work    Splinting:  [] Fabrication of: L-code  [] (05257) Checkout for orthotic/prosthetic use, established patient   [] (97128) Orthotic management and training (fitting and assessment)  [] Comments:    Charges:  Timed Code Treatment Minutes: 30   Total Treatment Minutes: 40   [] EVAL (LOW) 07057   [] OT Re-eval (10097)  [] EVAL (MOD) 96303   [] EVAL (HIGH) 34377       [x] Anjel (76214) x      [] FPATN(51913)  [] NMR (93832) x      [] Estim (attended) (00050)   [x] Manual ([id number]) x       [] US (86800)  [] TA (19896) x      [x] Paraffin (50141)  [] ADL  (88 649 24 60) x     [] Splint/L code:    [] Estim (unattended) (55329)  [] Other:  [] Fluidotherapy (38741)  [](14893) Checkout for orthotic use, established patient x       [] (88899) Orthotic mgmt & training  x        GOALS:  Short Term Goals: To be achieved in: 2 weeks  1. Independent in HEP and progression per patient tolerance, in order to prevent re-injury. 2. Patient will have a decrease in pain to facilitate improvement in movement, function, and ADLs as indicated by Functional Deficits. Long Term Goals to be achieved in 8  weeks, including patient directed goals to address identified performance deficits:  1) Pt to be independent in graded HEP progression with a good level of effort and compliance. MET  2) Pt to report a score of 68% or less on the Quick DASH disability questionnaire for increased performance with carrying, moving, and handling objects. MET  3) Pt will demonstrate increased ROM to [facility] for improved performance with self care activities, driving, vocational tasks, house hold chores, and fine object manipulation MET  4) Pt will demonstrate increased  strength to at least  25# for improved performance with self care activities, driving, vocational tasks, house hold chores, and fine object manipulation MET    5) Pt will have a decrease in pain to 2/10 or less to facilitate improvement in performance with self care activities, driving, vocational tasks, house hold chores, and fine object manipulation  (R hand yes. . L hand 3/10)    NEW GOALS: (set 11/20/18 )   Pt will increase R  to 50# for ability to open things  Pt. Will increase R pinch strength to 8# for increased ability to zip things     Progression Towards Functional goals:  [x] Patient is progressing as expected towards functional goals listed. [] Progression is slowed due to complexities listed.   [] Progression has

## 2018-12-06 ENCOUNTER — HOSPITAL ENCOUNTER (OUTPATIENT)
Dept: OCCUPATIONAL THERAPY | Age: 63
Setting detail: THERAPIES SERIES
End: 2018-12-06
Payer: COMMERCIAL

## 2018-12-07 ENCOUNTER — HOSPITAL ENCOUNTER (OUTPATIENT)
Dept: OCCUPATIONAL THERAPY | Age: 63
Setting detail: THERAPIES SERIES
Discharge: HOME | End: 2018-12-07
Payer: COMMERCIAL

## 2018-12-07 PROCEDURE — 97018 PARAFFIN BATH THERAPY: CPT | Performed by: OCCUPATIONAL THERAPIST

## 2018-12-07 PROCEDURE — 97110 THERAPEUTIC EXERCISES: CPT | Performed by: OCCUPATIONAL THERAPIST

## 2019-02-18 ENCOUNTER — TELEPHONE (OUTPATIENT)
Dept: INTERNAL MEDICINE CLINIC | Age: 64
End: 2019-02-18

## 2019-03-01 ENCOUNTER — HOSPITAL ENCOUNTER (OUTPATIENT)
Age: 64
Discharge: HOME | End: 2019-03-01
Payer: COMMERCIAL

## 2019-03-01 ENCOUNTER — OFFICE VISIT (OUTPATIENT)
Dept: INTERNAL MEDICINE CLINIC | Age: 64
End: 2019-03-01
Payer: COMMERCIAL

## 2019-03-01 ENCOUNTER — HOSPITAL ENCOUNTER (OUTPATIENT)
Dept: GENERAL RADIOLOGY | Age: 64
Discharge: HOME | End: 2019-03-01
Payer: COMMERCIAL

## 2019-03-01 VITALS
SYSTOLIC BLOOD PRESSURE: 124 MMHG | HEART RATE: 70 BPM | WEIGHT: 126 LBS | BODY MASS INDEX: 21.51 KG/M2 | HEIGHT: 64 IN | DIASTOLIC BLOOD PRESSURE: 76 MMHG | RESPIRATION RATE: 12 BRPM

## 2019-03-01 DIAGNOSIS — R52 PAIN: ICD-10-CM

## 2019-03-01 DIAGNOSIS — M54.9 BACK PAIN, UNSPECIFIED BACK LOCATION, UNSPECIFIED BACK PAIN LATERALITY, UNSPECIFIED CHRONICITY: Primary | ICD-10-CM

## 2019-03-01 DIAGNOSIS — M54.9 LEFT-SIDED BACK PAIN, UNSPECIFIED BACK LOCATION, UNSPECIFIED CHRONICITY: ICD-10-CM

## 2019-03-01 DIAGNOSIS — M54.9 BACK PAIN, UNSPECIFIED BACK LOCATION, UNSPECIFIED BACK PAIN LATERALITY, UNSPECIFIED CHRONICITY: ICD-10-CM

## 2019-03-01 PROCEDURE — 72100 X-RAY EXAM L-S SPINE 2/3 VWS: CPT

## 2019-03-01 PROCEDURE — 99213 OFFICE O/P EST LOW 20 MIN: CPT | Performed by: INTERNAL MEDICINE

## 2019-03-01 PROCEDURE — 73502 X-RAY EXAM HIP UNI 2-3 VIEWS: CPT

## 2019-03-01 RX ORDER — ESTRADIOL 1 MG/1
1 TABLET ORAL DAILY
COMMUNITY

## 2019-03-01 ASSESSMENT — PATIENT HEALTH QUESTIONNAIRE - PHQ9
2. FEELING DOWN, DEPRESSED OR HOPELESS: 0
SUM OF ALL RESPONSES TO PHQ QUESTIONS 1-9: 0
SUM OF ALL RESPONSES TO PHQ9 QUESTIONS 1 & 2: 0
1. LITTLE INTEREST OR PLEASURE IN DOING THINGS: 0
SUM OF ALL RESPONSES TO PHQ QUESTIONS 1-9: 0

## 2019-03-04 DIAGNOSIS — M47.26 OSTEOARTHRITIS OF SPINE WITH RADICULOPATHY, LUMBAR REGION: Primary | ICD-10-CM

## 2019-03-13 ENCOUNTER — HOSPITAL ENCOUNTER (OUTPATIENT)
Dept: PHYSICAL THERAPY | Age: 64
Setting detail: THERAPIES SERIES
Discharge: HOME | End: 2019-03-13
Payer: COMMERCIAL

## 2019-03-13 PROCEDURE — 97530 THERAPEUTIC ACTIVITIES: CPT

## 2019-03-13 PROCEDURE — 97110 THERAPEUTIC EXERCISES: CPT

## 2019-03-13 PROCEDURE — 97162 PT EVAL MOD COMPLEX 30 MIN: CPT

## 2019-03-20 ENCOUNTER — HOSPITAL ENCOUNTER (OUTPATIENT)
Dept: PHYSICAL THERAPY | Age: 64
Setting detail: THERAPIES SERIES
Discharge: HOME | End: 2019-03-20
Payer: COMMERCIAL

## 2019-03-20 PROCEDURE — 97110 THERAPEUTIC EXERCISES: CPT

## 2019-03-20 PROCEDURE — 97140 MANUAL THERAPY 1/> REGIONS: CPT

## 2019-03-22 ENCOUNTER — HOSPITAL ENCOUNTER (OUTPATIENT)
Dept: PHYSICAL THERAPY | Age: 64
Setting detail: THERAPIES SERIES
Discharge: HOME | End: 2019-03-22
Payer: COMMERCIAL

## 2019-03-22 PROCEDURE — 97110 THERAPEUTIC EXERCISES: CPT

## 2019-03-22 PROCEDURE — 97140 MANUAL THERAPY 1/> REGIONS: CPT

## 2019-03-26 ENCOUNTER — HOSPITAL ENCOUNTER (OUTPATIENT)
Dept: PHYSICAL THERAPY | Age: 64
Setting detail: THERAPIES SERIES
Discharge: HOME | End: 2019-03-26
Payer: COMMERCIAL

## 2019-03-26 PROCEDURE — 97140 MANUAL THERAPY 1/> REGIONS: CPT

## 2019-03-26 PROCEDURE — 97110 THERAPEUTIC EXERCISES: CPT

## 2019-04-01 ENCOUNTER — HOSPITAL ENCOUNTER (OUTPATIENT)
Dept: PHYSICAL THERAPY | Age: 64
Setting detail: THERAPIES SERIES
Discharge: HOME | End: 2019-04-01
Payer: COMMERCIAL

## 2019-04-01 PROCEDURE — 97140 MANUAL THERAPY 1/> REGIONS: CPT

## 2019-04-01 PROCEDURE — 97110 THERAPEUTIC EXERCISES: CPT

## 2019-04-01 NOTE — FLOWSHEET NOTE
Physical Therapy Daily Treatment Note    Date:  2019    Patient Name:  Delmer Sharif    :  1955  MRN: [id number]  Restrictions/Precautions:    Medical/Treatment Diagnosis Information:  · Diagnosis: lumbar OA  Insurance/Certification information:  PT Insurance Information: [employer] [place]  Physician Information:  Referring Practitioner: Terri Barron MD  Plan of care signed (Y/N):  N  Visit# / total visits:       G-Code (if applicable):         PT G-Codes  Functional Assessment Tool Used: Modified Oswestry  Score: 16 (32% disability)    Time in:   9:30      Timed Treatment: 40 Total Treatment Time:  40  ________________________________________________________________________________________    Pain Level:    /10  SUBJECTIVE:  Pt reports that her L lower back is bothering her today. OBJECTIVE:     Exercise/Equipment Resistance/Repetitions Other comments   TG 5 min           TA set    BKF  BP cuff          bridge 10x3\" hold HEP   Clam #1  HEP          Supine lumbopelvic stabilization     PHE  HEP   Prone hip neuro re-ed 3 min BLE MRE and iso    Side-lying hip ER  HEP   Supine clams  SL hip ER with stabilization  x15  x10 B Green TB                 Hip stack 2x30\" BLE                                  Other Therapeutic Activities:  Pt educated on biomechanics of lumbar spine, SIJ and pelvis. Pt also educated on extension rotation syndrome of lumbar spine. Manual Treatments:    Hip LAD grade IV B (with change in SLR); shotgun technique with cavitation; ; prone lumbar PA grade IV mobilization     Modalities:      Test/Measurements:  No SIJD       ASSESSMENT:  Pt tolerated session well. No SIJD or LLD this session. Progressed well with exercise.     Treatment/Activity Tolerance:   [x] Patient tolerated treatment well [] Patient limited by fatique  [] Patient limited by pain [] Patient limited by other medical complications  [] Other:     Goals:    Short term goals  Time Frame for Short term goals: 4 weeks  Short term goal 1: pt will be indep in HEP  Short term goal 2: pt will decrease pain 50%  Short term goal 3: pt will increase B hip strength to 4/5  Short term goal 4: pt will return to clinic with symmetrical pelvic alignment  Short term goal 5: pt will return to tennis without limitations           Plan: [x] Continue per plan of care [] Alter current plan (see comments)   [] Plan of care initiated [] Hold pending MD visit [] Discharge      Plan for Next Session:  Biomechanical correction of problems as they present. Facilitate correct muscle firing patterns and activation with appropriate activities. Progress patient as tolerated.      Re-Certification Due Date:         Signature:  Huan Rahman PT

## 2019-04-03 ENCOUNTER — HOSPITAL ENCOUNTER (OUTPATIENT)
Dept: PHYSICAL THERAPY | Age: 64
Setting detail: THERAPIES SERIES
Discharge: HOME | End: 2019-04-03
Payer: COMMERCIAL

## 2019-04-03 PROCEDURE — 97110 THERAPEUTIC EXERCISES: CPT

## 2019-04-03 PROCEDURE — 97140 MANUAL THERAPY 1/> REGIONS: CPT

## 2019-04-05 ENCOUNTER — HOSPITAL ENCOUNTER (OUTPATIENT)
Dept: PHYSICAL THERAPY | Age: 64
Setting detail: THERAPIES SERIES
Discharge: HOME | End: 2019-04-05
Payer: COMMERCIAL

## 2019-04-05 PROCEDURE — 97110 THERAPEUTIC EXERCISES: CPT

## 2019-04-05 PROCEDURE — 97112 NEUROMUSCULAR REEDUCATION: CPT

## 2019-04-05 PROCEDURE — 97140 MANUAL THERAPY 1/> REGIONS: CPT

## 2019-04-05 NOTE — FLOWSHEET NOTE
Physical Therapy Daily Treatment Note    Date:  2019    Patient Name:  Gisell Robison    :  1955  MRN: [id number]  Restrictions/Precautions:    Medical/Treatment Diagnosis Information:  · Diagnosis: lumbar OA  Insurance/Certification information:  PT Insurance Information: Med [place]  Physician Information:  Referring Practitioner: Sandeep Loredo MD  Plan of care signed (Y/N):  N  Visit# / total visits:       G-Code (if applicable):         PT G-Codes  Functional Assessment Tool Used: Modified Oswestry  Score: 16 (32% disability)    Time in:   9:30      Timed Treatment: 53 Total Treatment Time:  53  ________________________________________________________________________________________    Pain Level:    /10  SUBJECTIVE:  Pt reports that she doesn't know if she is doing the standing exercises correctly. \"I have a hard time visualizing what I need to do. \"    OBJECTIVE:     Exercise/Equipment Resistance/Repetitions Other comments   TG 5 min           TA set    BKF  BP cuff          bridge 10x3\" hold HEP   Clam #1  HEP          Supine lumbopelvic stabilization     PHE  HEP   Prone hip neuro re-ed 3 min BLE MRE and iso    Side-lying hip ER  HEP   Supine clams  SL hip ER with stabilization   Green TB                 Hip stack 2x30\" BLE    Standing hip abduction x15 BLE 10#   Multif press x15 B Maroon SC   B shoulder extension x15 Maroon SC            Other Therapeutic Activities:  Pt educated on biomechanics of lumbar spine, SIJ and pelvis. Pt also educated on extension rotation syndrome of lumbar spine. Pt also educated on lateral hip stabilization and how to achieve. Manual Treatments:    ; R dalia with set-up cavitation; prone lumbar PA grade IV mobilization; prone hip flexor stretch with and without pelvic oscillations BLE     Modalities:      Test/Measurements:  No SIJD       ASSESSMENT:  Pt tolerated session well. SIJD corrected through manual therapy.  Pt having increased difficulty understanding the concept and how to achieve lateral hip stabilization. Pt required moderate verbal cueing for lumbar positioning and TA activation. Treatment/Activity Tolerance:   [x] Patient tolerated treatment well [] Patient limited by fatique  [] Patient limited by pain [] Patient limited by other medical complications  [] Other:     Goals:    Short term goals  Time Frame for Short term goals: 4 weeks  Short term goal 1: pt will be indep in HEP  Short term goal 2: pt will decrease pain 50%  Short term goal 3: pt will increase B hip strength to 4/5  Short term goal 4: pt will return to clinic with symmetrical pelvic alignment  Short term goal 5: pt will return to tennis without limitations           Plan: [x] Continue per plan of care [] Alter current plan (see comments)   [] Plan of care initiated [] Hold pending MD visit [] Discharge      Plan for Next Session:  Biomechanical correction of problems as they present. Facilitate correct muscle firing patterns and activation with appropriate activities. Progress patient as tolerated.      Re-Certification Due Date:         Signature:  Shawn King PT

## 2019-04-10 ENCOUNTER — HOSPITAL ENCOUNTER (OUTPATIENT)
Dept: PHYSICAL THERAPY | Age: 64
Setting detail: THERAPIES SERIES
Discharge: HOME | End: 2019-04-10
Payer: COMMERCIAL

## 2019-04-10 PROCEDURE — 97110 THERAPEUTIC EXERCISES: CPT

## 2019-04-10 PROCEDURE — 97140 MANUAL THERAPY 1/> REGIONS: CPT

## 2019-04-10 NOTE — PROGRESS NOTES
Physical Therapy Progress Note    Date:  4/10/2019    Patient Name:  Whit Tolliver    :  1955  MRN: [id number]  Restrictions/Precautions:    Medical/Treatment Diagnosis Information:  · Diagnosis: lumbar OA  Insurance/Certification information:  PT Insurance Information: Med [place]  Physician Information:  Referring Practitioner: Camilla Bellamy MD  Plan of care signed (Y/N):  N  Visit# / total visits:       G-Code (if applicable):         PT G-Codes  Functional Assessment Tool Used: Modified Oswestry  Score: 16 (32% disability)  ________________________________________________________________________________________    Pain Level:    /10  SUBJECTIVE:  Pt reports that she has been pretty good but today she is sore. ASSESSMENT:  Pt tolerated session well. SIJD corrected through manual therapy. Pt having increased difficulty understanding the concept and how to achieve lateral hip stabilization. Pt required moderate verbal cueing for lumbar positioning and TA activation. Initiated dowel linda training for neuro re-education of forward bending, with good results. Pt would benefit from additional therapy at 1x/wk for 4 weeks.     Treatment/Activity Tolerance:   [x] Patient tolerated treatment well [] Patient limited by fatique  [] Patient limited by pain [] Patient limited by other medical complications  [] Other:     Goals:    Short term goals  Time Frame for Short term goals: 4 weeks  Short term goal 1: pt will be indep in HEP - MET  Short term goal 2: pt will decrease pain 50% - MET  Short term goal 3: pt will increase B hip strength to 4/5 - NOT MET  Short term goal 4: pt will return to clinic with symmetrical pelvic alignment - NOT MET (slight R AI)  Short term goal 5: pt will return to tennis without limitations - NOT MET          Plan: [x] Continue per plan of care [x] Alter current plan (see comments)   [] Plan of care initiated [] Hold pending MD visit [] Discharge       Signature:  Tania Wright Rebecca Wiseman  Physician Signature:_______________________________Date:__________________  By signing above (or electronic signature), therapists plan is approved by physician

## 2019-04-10 NOTE — FLOWSHEET NOTE
Physical Therapy Daily Treatment Note    Date:  4/10/2019    Patient Name:  Ana María Mcpherson    :  1955  MRN: [id number]  Restrictions/Precautions:    Medical/Treatment Diagnosis Information:  · Diagnosis: lumbar OA  Insurance/Certification information:  PT Insurance Information: Med [place]  Physician Information:  Referring Practitioner: Sarita Miranda MD  Plan of care signed (Y/N):  N  Visit# / total visits:       G-Code (if applicable):         PT G-Codes  Functional Assessment Tool Used: Modified Oswestry  Score: 16 (32% disability)    Time in:   9:30      Timed Treatment: 42 Total Treatment Time: 42  ________________________________________________________________________________________    Pain Level:    /10  SUBJECTIVE:  Pt reports that she has been pretty good but today she is sore. OBJECTIVE:     Exercise/Equipment Resistance/Repetitions Other comments   TG 5 min           TA set    BKF  BP cuff          bridge 10x3\" hold HEP   Clam #1  HEP          Supine lumbopelvic stabilization     PHE  HEP   Prone hip neuro re-ed 3 min BLE MRE and iso    Side-lying hip ER  HEP   Supine clams  SL hip ER with stabilization   Green TB                 Hip stack     Standing hip abduction x15 BLE 10#   Multif press x15 B Maroon SC   B shoulder extension x15 Maroon SC   Dowel linda training 5 min      Other Therapeutic Activities:  Pt educated on biomechanics of lumbar spine, SIJ and pelvis. Pt also educated on extension rotation syndrome of lumbar spine. Pt also educated on lateral hip stabilization and how to achieve. Manual Treatments:    ; ; prone lumbar PA grade IV mobilization; prone hip flexor stretch with and without pelvic oscillations BLE; breaking bread and lumbar rotation mobilization grade IV    Modalities:      Test/Measurements:         ASSESSMENT:  Pt tolerated session well. SIJD corrected through manual therapy.  Pt having increased difficulty understanding the concept and how to achieve

## 2019-04-12 ENCOUNTER — APPOINTMENT (OUTPATIENT)
Dept: PHYSICAL THERAPY | Age: 64
End: 2019-04-12
Payer: COMMERCIAL

## 2019-04-16 ENCOUNTER — HOSPITAL ENCOUNTER (OUTPATIENT)
Dept: PHYSICAL THERAPY | Age: 64
Setting detail: THERAPIES SERIES
Discharge: HOME | End: 2019-04-16
Payer: COMMERCIAL

## 2019-04-16 PROCEDURE — 97110 THERAPEUTIC EXERCISES: CPT

## 2019-04-16 PROCEDURE — 97140 MANUAL THERAPY 1/> REGIONS: CPT

## 2019-04-16 NOTE — FLOWSHEET NOTE
Physical Therapy Daily Treatment Note    Date:  2019    Patient Name:  Ronna Carlisle    :  1955  MRN: [id number]  Restrictions/Precautions:    Medical/Treatment Diagnosis Information:  · Diagnosis: lumbar OA  Insurance/Certification information:  PT Insurance Information: Med [place]  Physician Information:  Referring Practitioner: Kervin Apodaca MD  Plan of care signed (Y/N):  N  Visit# / total visits:      G-Code (if applicable):         PT G-Codes  Functional Assessment Tool Used: Modified Oswestry  Score: 16 (32% disability)    Time in:   10:30      Timed Treatment: 40 Total Treatment Time: 40  ________________________________________________________________________________________    Pain Level:    /10  SUBJECTIVE:  Pt reports that she was feeling symptoms on her R side this morning but now, moving around on the table, she feels it on her L side. \"Some days I have no pain, and then other day, I have pain. \"    OBJECTIVE:     Exercise/Equipment Resistance/Repetitions Other comments   TG 6 min           TA set    BKF  BP cuff          bridge 10x3\" hold HEP   Clam #1  HEP          Supine lumbopelvic stabilization     PHE  HEP   Prone hip neuro re-ed 3 min BLE MRE and iso    Side-lying hip ER  HEP   Supine clams  SL hip ER with stabilization   Green TB                 Hip stack     Standing hip abduction 10#   Multif press Maroon SC   B shoulder extension Maroon SC   Dowel linda training 10 min FF, STS     Other Therapeutic Activities:  Pt educated on biomechanics of lumbar spine, SIJ and pelvis. Pt also educated on extension rotation syndrome of lumbar spine. Pt also educated on lateral hip stabilization and how to achieve.      Manual Treatments:    ;; prone lumbar PA grade IV mobilization; prone hip flexor stretch with and without pelvic oscillations BLE; breaking bread and lumbar rotation mobilization grade IV    Modalities:      Test/Measurements:         ASSESSMENT:  Pt tolerated session well. SIJD corrected through manual therapy. Pt having increased difficulty understanding the concept and how to achieve lateral hip stabilization. Pt required moderate verbal cueing for lumbar positioning and TA activation. Initiated dowel linda training for neuro re-education of forward bending and sit-to-stand, with good results. Treatment/Activity Tolerance:   [x] Patient tolerated treatment well [] Patient limited by fatique  [] Patient limited by pain [] Patient limited by other medical complications  [] Other:     Goals:    Short term goals  Time Frame for Short term goals: 4 weeks  Short term goal 1: pt will be indep in HEP - MET  Short term goal 2: pt will decrease pain 50% - MET  Short term goal 3: pt will increase B hip strength to 4/5 - NOT MET  Short term goal 4: pt will return to clinic with symmetrical pelvic alignment - NOT MET (slight R AI)  Short term goal 5: pt will return to tennis without limitations - NOT MET          Plan: [x] Continue per plan of care [] Alter current plan (see comments)   [] Plan of care initiated [] Hold pending MD visit [] Discharge      Plan for Next Session:  Biomechanical correction of problems as they present. Facilitate correct muscle firing patterns and activation with appropriate activities. Progress patient as tolerated.      Re-Certification Due Date:         Signature:  Nica Jin, PT

## 2019-04-23 ENCOUNTER — HOSPITAL ENCOUNTER (OUTPATIENT)
Dept: PHYSICAL THERAPY | Age: 64
Setting detail: THERAPIES SERIES
Discharge: HOME | End: 2019-04-23
Payer: COMMERCIAL

## 2019-04-23 PROCEDURE — 97140 MANUAL THERAPY 1/> REGIONS: CPT

## 2019-04-23 PROCEDURE — 97112 NEUROMUSCULAR REEDUCATION: CPT

## 2019-04-23 PROCEDURE — 97110 THERAPEUTIC EXERCISES: CPT

## 2019-04-23 NOTE — FLOWSHEET NOTE
Physical Therapy Daily Treatment Note    Date:  2019    Patient Name:  Hyun August    :  1955  MRN: [id number]  Restrictions/Precautions:    Medical/Treatment Diagnosis Information:  · Diagnosis: lumbar OA  Insurance/Certification information:  PT Insurance Information: Med [place]  Physician Information:  Referring Practitioner: Eddie Goss MD  Plan of care signed (Y/N):  N  Visit# / total visits:      G-Code (if applicable):         PT G-Codes  Functional Assessment Tool Used: Modified Oswestry  Score: 16 (32% disability)    Time in:   9:30      Timed Treatment: 42 Total Treatment Time: 42  ________________________________________________________________________________________    Pain Level:    /10  SUBJECTIVE:  Pt reports that she tried to play tennis but both sides of her back hurt when she ran. OBJECTIVE:     Exercise/Equipment Resistance/Repetitions Other comments   TG 6 min           TA set    BKF  BP cuff          bridge  HEP   Clam #1  HEP          Supine lumbopelvic stabilization     PHE  HEP   Prone hip neuro re-ed 3 min BLE MRE and iso    Side-lying hip ER  HEP   Supine clams  SL hip ER with stabilization   Green TB                 Hip stack     Standing hip abduction 10#   Multif press Maroon SC   B shoulder extension Maroon SC   Dowel linda training  Doorway squat 10 min  5 min STS, standing squat     Other Therapeutic Activities:  Pt educated on biomechanics of lumbar spine, SIJ and pelvis. Pt also educated on extension rotation syndrome of lumbar spine. Pt also educated on lateral hip stabilization and how to achieve. Manual Treatments:    ;; prone lumbar PA grade IV mobilization; prone hip flexor stretch with and without pelvic oscillations BLE; breaking bread and lumbar rotation mobilization grade IV; scissors MET with dowel linda for HEP    Modalities:      Test/Measurements:         ASSESSMENT:  Pt tolerated session well. SIJD corrected through manual therapy.  Pt required moderate verbal cueing for lumbar positioning and TA activation. Initiated dowel linda training for neuro re-education of standing squat and sit-to-stand, with good results by the end of session    Treatment/Activity Tolerance:   [x] Patient tolerated treatment well [] Patient limited by fatique  [] Patient limited by pain [] Patient limited by other medical complications  [] Other:     Goals:    Short term goals  Time Frame for Short term goals: 4 weeks  Short term goal 1: pt will be indep in HEP - MET  Short term goal 2: pt will decrease pain 50% - MET  Short term goal 3: pt will increase B hip strength to 4/5 - NOT MET  Short term goal 4: pt will return to clinic with symmetrical pelvic alignment - NOT MET (slight R AI)  Short term goal 5: pt will return to tennis without limitations - NOT MET          Plan: [x] Continue per plan of care [] Alter current plan (see comments)   [] Plan of care initiated [] Hold pending MD visit [] Discharge      Plan for Next Session:  Biomechanical correction of problems as they present. Facilitate correct muscle firing patterns and activation with appropriate activities. Progress patient as tolerated.      Re-Certification Due Date:         Signature:  Markus Randall, PT

## 2019-05-01 ENCOUNTER — HOSPITAL ENCOUNTER (OUTPATIENT)
Dept: PHYSICAL THERAPY | Age: 64
Setting detail: THERAPIES SERIES
Discharge: HOME | End: 2019-05-01
Payer: COMMERCIAL

## 2019-05-01 PROCEDURE — 97110 THERAPEUTIC EXERCISES: CPT

## 2019-05-01 PROCEDURE — 97140 MANUAL THERAPY 1/> REGIONS: CPT

## 2019-05-01 NOTE — FLOWSHEET NOTE
Physical Therapy Daily Treatment Note    Date:  2019    Patient Name:  Johan Casas    :  1955  MRN: [id number]  Restrictions/Precautions:    Medical/Treatment Diagnosis Information:  · Diagnosis: lumbar OA  Insurance/Certification information:  PT Insurance Information: Med [place]  Physician Information:  Referring Practitioner: Lisy Torres MD  Plan of care signed (Y/N):  N  Visit# / total visits:   3/    G-Code (if applicable):         PT G-Codes  Functional Assessment Tool Used: Modified Oswestry  Score: 16 (32% disability)    Time in:   9:30      Timed Treatment: 30 Total Treatment Time: 30  ________________________________________________________________________________________    Pain Level:    /10  SUBJECTIVE:  Pt reports that she is improving. She is able to move a little better. OBJECTIVE:     Exercise/Equipment Resistance/Repetitions Other comments   TG 6 min           TA set    BKF  BP cuff          bridge  HEP   Clam #1  HEP          Supine lumbopelvic stabilization     PHE  HEP   Prone hip neuro re-ed 3 min BLE MRE and iso    Side-lying hip ER  HEP   Supine clams  SL hip ER with stabilization   Green TB                 Hip stack     Standing hip abduction 10#   Multif press Maroon SC   B shoulder extension Maroon SC   Dowel linda training  Doorway squat 10 min  5 min STS, standing squat     Other Therapeutic Activities:  Pt educated on biomechanics of lumbar spine, SIJ and pelvis. Pt also educated on extension rotation syndrome of lumbar spine. Pt also educated on lateral hip stabilization and how to achieve. Manual Treatments:    ;; prone lumbar PA grade IV mobilization; prone hip flexor stretch with and without pelvic oscillations BLE; ;     Modalities:      Test/Measurements:         ASSESSMENT:  Pt tolerated session well. No SIJD. Pt showing good improvements in squat technique and lumbar control.     Treatment/Activity Tolerance:   [x] Patient tolerated treatment well [] Patient limited by fatique  [] Patient limited by pain [] Patient limited by other medical complications  [] Other:     Goals:    Short term goals  Time Frame for Short term goals: 4 weeks  Short term goal 1: pt will be indep in HEP - MET  Short term goal 2: pt will decrease pain 50% - MET  Short term goal 3: pt will increase B hip strength to 4/5 - NOT MET  Short term goal 4: pt will return to clinic with symmetrical pelvic alignment - NOT MET (slight R AI)  Short term goal 5: pt will return to tennis without limitations - NOT MET          Plan: [x] Continue per plan of care [] Alter current plan (see comments)   [] Plan of care initiated [] Hold pending MD visit [] Discharge      Plan for Next Session:  Biomechanical correction of problems as they present. Facilitate correct muscle firing patterns and activation with appropriate activities. Progress patient as tolerated.      Re-Certification Due Date:         Signature:  Antonia Mabry PT

## 2019-05-07 ENCOUNTER — HOSPITAL ENCOUNTER (OUTPATIENT)
Dept: PHYSICAL THERAPY | Age: 64
Setting detail: THERAPIES SERIES
Discharge: HOME | End: 2019-05-07
Payer: COMMERCIAL

## 2019-05-07 PROCEDURE — 97140 MANUAL THERAPY 1/> REGIONS: CPT

## 2019-05-07 PROCEDURE — 97110 THERAPEUTIC EXERCISES: CPT

## 2019-05-07 NOTE — FLOWSHEET NOTE
Physical Therapy Daily Treatment / Discharge Note    Date:  2019    Patient Name:  James Wilson    :  1955  MRN: [id number]  Restrictions/Precautions:    Medical/Treatment Diagnosis Information:  · Diagnosis: lumbar OA  Insurance/Certification information:  PT Insurance Information: Med [place]  Physician Information:  Referring Practitioner: Latoya Tejada MD  Plan of care signed (Y/N):  N  Visit# / total visits:      G-Code (if applicable):         PT G-Codes  Functional Assessment Tool Used: Modified Oswestry  Score: 2 (16 at eval)    Time in:   9:30      Timed Treatment: 30 Total Treatment Time: 30  ________________________________________________________________________________________    Pain Level:    /10  SUBJECTIVE:  Pt reports that she has improved a lot. Notes that the only discomfort she gets is when she sprints during a tennis match. OBJECTIVE:     Exercise/Equipment Resistance/Repetitions Other comments   TG 6 min           TA set    BKF  BP cuff          bridge  HEP   Clam #1  HEP          Supine lumbopelvic stabilization     PHE  HEP   Prone hip neuro re-ed     Side-lying hip ER  HEP   Supine clams  SL hip ER with stabilization   Green TB          3-way hip flexor stretch  EC, hip stack 5 min      Hip stack     Standing hip abduction 10#   Multif press Maroon SC   B shoulder extension Maroon SC   Dowel linda training  Doorway squat  STS, standing squat     Other Therapeutic Activities:  Pt educated on biomechanics of lumbar spine, SIJ and pelvis. Pt also educated on extension rotation syndrome of lumbar spine. Pt also educated on lateral hip stabilization and how to achieve.      Manual Treatments:    ;; prone lumbar PA grade IV mobilization; prone hip flexor stretch with and without pelvic oscillations BLE; ;     Modalities:      Test/Measurements:      PROM RLE (degrees)  R Hip External Rotation 0-45: 0-40*  R Hip Internal Rotation 0-45: 0-40*  PROM LLE (degrees)  L Hip External Rotation 0-45: 0-40*  L Hip Internal Rotation 0-45: 0-40*  Spine  Lumbar: Flexion and Extension WFL; Sidebending - WFL B     Strength RLE  Comment: hip ER 4+/5, hip ABD 4/5, PGM 4-/5, glut max 4-/5  Strength LLE  Comment: hip ER 4+/5, hip ABD 4/5, PGM 4-/5, glut max 4-/5       ASSESSMENT:  Pt progressed well with course of physical therapy, and no longer displays symptoms from initial evaluation (expect with quick agility/sprinting motions). Pt demonstrated equal strength and ROM B and has been able to return to all necessary ADLs and exercise-related activities. Pt has met 4/5 established goals, and feels confident continuing with HEP. Will discharge pt from formal PT at this time. See tests and measures for further details. Treatment/Activity Tolerance:   [x] Patient tolerated treatment well [] Patient limited by fatique  [] Patient limited by pain [] Patient limited by other medical complications  [] Other:     Goals:    Short term goals  Time Frame for Short term goals: 4 weeks  Short term goal 1: pt will be indep in HEP - MET  Short term goal 2: pt will decrease pain 50% - MET  Short term goal 3: pt will increase B hip strength to 4/5 - MET  Short term goal 4: pt will return to clinic with symmetrical pelvic alignment - MET   Short term goal 5: pt will return to tennis without limitations - NOT MET          Plan: [] Continue per plan of care [] Alter current plan (see comments)   [] Plan of care initiated [] Hold pending MD visit [x] Discharge      Plan for Next Session:  Biomechanical correction of problems as they present. Facilitate correct muscle firing patterns and activation with appropriate activities. Progress patient as tolerated.      Re-Certification Due Date:         Signature:  Dougie Naidu, PT

## 2019-05-16 ENCOUNTER — OFFICE VISIT (OUTPATIENT)
Dept: ORTHOPEDIC SURGERY | Age: 64
End: 2019-05-16
Payer: COMMERCIAL

## 2019-05-16 DIAGNOSIS — M18.12 ARTHRITIS OF CARPOMETACARPAL (CMC) JOINT OF LEFT THUMB: Primary | ICD-10-CM

## 2019-05-16 DIAGNOSIS — M18.11 ARTHRITIS OF CARPOMETACARPAL (CMC) JOINT OF RIGHT THUMB: ICD-10-CM

## 2019-05-16 PROCEDURE — 3017F COLORECTAL CA SCREEN DOC REV: CPT | Performed by: ORTHOPAEDIC SURGERY

## 2019-05-16 PROCEDURE — 1036F TOBACCO NON-USER: CPT | Performed by: ORTHOPAEDIC SURGERY

## 2019-05-16 PROCEDURE — 99214 OFFICE O/P EST MOD 30 MIN: CPT | Performed by: ORTHOPAEDIC SURGERY

## 2019-05-16 PROCEDURE — 20600 DRAIN/INJ JOINT/BURSA W/O US: CPT | Performed by: ORTHOPAEDIC SURGERY

## 2019-05-16 PROCEDURE — G8420 CALC BMI NORM PARAMETERS: HCPCS | Performed by: ORTHOPAEDIC SURGERY

## 2019-05-16 PROCEDURE — G8427 DOCREV CUR MEDS BY ELIG CLIN: HCPCS | Performed by: ORTHOPAEDIC SURGERY

## 2019-05-16 NOTE — PROGRESS NOTES
Injection administration details:  Date & Time: 5/16/19 1:44 PM  Site & Comments:LEFT THUMB CMC administered by Dr Jose Mcleod    Betamethasone (30mg/mL)  # of cc: 1  [facility]:4323-3760-06   Lot number: [id number]  EXP: 09/2020    1% Lidocaine(10mg/ mL)  # of cc:1  [facility]: 12233-435-37  Lot number: [id number]  EXP: 10/2020

## 2019-05-16 NOTE — PROGRESS NOTES
Chief Complaint:  Hand Pain      History of Present of Illness: The patient is back for both thumbs. She is leaving for a trip for 5 weeks to [facility] and would like to have improved comfort to the left thumb. In the past she believes that the cortisone injections have given her meaningful and lasting relief. On the right side she's done very well and has returned to playing tennis but occasionally has some sharp pains more in the forearm. She is very pleased overall though with her results after thumb CMC arthroplasty on the right. Review of Systems  Pertinent items are noted in HPI  Denies fever, chills, confusion, bowel/bladder active change. Review of systems reviewed from Patient History Form dated on 5/16/19 and available in the patient's chart under the Media tab. Examination:  On exam today she has a stable exam on the right thumb with incisions nicely healed and excellent range of motion. There is no instability on the right. On the left side she has a positive thumb CMC grind and no instability at the MP joint. All fingers are perfused and sensate. Provocative testing for carpal tunnel syndrome on the right is equivocal.    Radiology:     X-rays obtained and reviewed in office:  Views     Location    Impression      Orders Placed This Encounter   Procedures    OK BETAMETHASONE ACET&SOD PHOSP    OK ARTHROCENTESIS ASPIR&/INJ SMALL JT/BURSA W/O US       Impression:  Encounter Diagnoses   Name Primary?  Arthritis of carpometacarpal (CMC) joint of left thumb Yes    Arthritis of carpometacarpal (CMC) joint of right thumb          Treatment Plan:  Overall doing well after right thumb CMC arthroplasty but with ongoing symptoms on the opposite left side. Given the timeframe of going away she is interested today in a cortisone injection but we also talked about using her brace, activity changes, and modifying her .   Down the road if she starts to have any signs of numbness or tingling I did  her that we would consider formalized electrodiagnostic studies as well. Under sterile conditions, I injected the left thumb CMC joint with 1% lidocaine and 1 mL of Celestone. Appropriate injection risks and instructions were discussed. She does know that she is an excellent candidate for elective left thumb CMC arthroplasty in the future when she feels ready. Please note that this transcription was created using voice recognition software. Any errors are unintentional and may be due to voice recognition transcription.

## 2019-06-15 RX ORDER — SERTRALINE HYDROCHLORIDE 100 MG/1
TABLET, FILM COATED ORAL
Qty: 30 TABLET | Refills: 10 | Status: SHIPPED | OUTPATIENT
Start: 2019-06-15 | End: 2020-03-23

## 2019-06-24 ENCOUNTER — TELEPHONE (OUTPATIENT)
Dept: INTERNAL MEDICINE CLINIC | Age: 64
End: 2019-06-24

## 2019-06-24 DIAGNOSIS — M47.26 OSTEOARTHRITIS OF SPINE WITH RADICULOPATHY, LUMBAR REGION: Primary | ICD-10-CM

## 2019-06-24 NOTE — TELEPHONE ENCOUNTER
Needs another order for PT put in epic   She will go back to [place] for it  She has been in [facility] about a month and the PT really had helped until a few weeks ago   She wants to start it back up when she gets back in town next week

## 2019-07-02 ENCOUNTER — HOSPITAL ENCOUNTER (OUTPATIENT)
Dept: MAMMOGRAPHY | Age: 64
Discharge: HOME | End: 2019-07-02
Payer: COMMERCIAL

## 2019-07-02 DIAGNOSIS — Z12.31 VISIT FOR SCREENING MAMMOGRAM: ICD-10-CM

## 2019-07-02 PROCEDURE — 77063 BREAST TOMOSYNTHESIS BI: CPT

## 2019-07-25 ENCOUNTER — HOSPITAL ENCOUNTER (OUTPATIENT)
Dept: PHYSICAL THERAPY | Age: 64
Setting detail: THERAPIES SERIES
Discharge: HOME | End: 2019-07-25
Payer: COMMERCIAL

## 2019-07-25 PROCEDURE — 97110 THERAPEUTIC EXERCISES: CPT

## 2019-07-25 PROCEDURE — 97140 MANUAL THERAPY 1/> REGIONS: CPT

## 2019-07-25 PROCEDURE — 97161 PT EVAL LOW COMPLEX 20 MIN: CPT

## 2019-07-25 NOTE — FLOWSHEET NOTE
linda training  Doorway squat  STS, standing squat     Other Therapeutic Activities:  Pt educated on biomechanics of lumbar spine, SIJ and pelvis. Pt also educated on extension rotation syndrome of lumbar spine. Pt also educated on lateral hip stabilization and how to achieve. Manual Treatments:    Hip LAD grade IV B (with change in SLR);; ; ; ; prone SI correction grade V with cavitation; hit tech to R greater trochanter    Modalities:      Test/Measurements:      PROM RLE (degrees)  R Hip External Rotation 0-45: 0-20*  R Hip Internal Rotation 0-45: 0-30*  PROM LLE (degrees)  L Hip External Rotation 0-45: 0-40*  L Hip Internal Rotation 0-45: 0-40*  Spine  Lumbar: Flexion and Extension WFL; Sidebending - WFL B     Strength RLE  Comment: hip ER 4/5, hip ABD 4/5, PGM 4-/5, glut max 4-/5  Strength LLE  Comment: hip ER 4+/5, hip ABD 4/5, PGM 4-/5, glut max 4-/5    Landmarks: Supine - R ASIS inferior, RLE longer; Prone - R PSIS inferior       ASSESSMENT:  Pt returned to physical therapy with a new script for same diagnosis as seen previously this year. Pt continues with fair strength but has returned to poor muscle activation patterns of glut complex, lumbopelvic stabilizers and hip intrinsics BLE. Pt also demonstrated decreased R hip mobility and R innominate upslip. Pt will benefit from physical therapy at 2x/wk for 4 weeks.     Treatment/Activity Tolerance:   [x] Patient tolerated treatment well [] Patient limited by fatique  [] Patient limited by pain [] Patient limited by other medical complications  [] Other:     Goals:    Short term goals  Time Frame for Short term goals: 4 weeks  Short term goal 1: pt will be indep in HEP   Short term goal 2: pt will decrease pain 50%  Short term goal 3: pt will increase B hip strength to 4+/5  Short term goal 4: pt will return to clinic with symmetrical pelvic alignment   Short term goal 5: pt will return to tennis without limitations          Plan: [x] Continue per plan of

## 2019-07-29 ENCOUNTER — HOSPITAL ENCOUNTER (OUTPATIENT)
Dept: PHYSICAL THERAPY | Age: 64
Setting detail: THERAPIES SERIES
Discharge: HOME | End: 2019-07-29
Payer: COMMERCIAL

## 2019-07-29 PROCEDURE — 97140 MANUAL THERAPY 1/> REGIONS: CPT

## 2019-07-29 PROCEDURE — 97110 THERAPEUTIC EXERCISES: CPT

## 2019-07-29 NOTE — FLOWSHEET NOTE
Physical Therapy Daily Treament Note    Date:  2019    Patient Name:  Hakeem Andersen    :  1955  MRN: [id number]  Restrictions/Precautions:    Medical/Treatment Diagnosis Information:  · Diagnosis: lumbar OA  Insurance/Certification information:  PT Insurance Information: Med [place]  Physician Information:  Referring Practitioner: Yvrose Marshall MD  Plan of care signed (Y/N):  N  Visit# / total visits:   (12 previous this year)    G-Code (if applicable):         PT G-Codes  Functional Assessment Tool Used: Modified Oswestry  Score: 2 (16 at eval)    Time in:   10:00      Timed Treatment: 45 Total Treatment Time: 45  ________________________________________________________________________________________    Pain Level:    /10  SUBJECTIVE:  Pt notes that she played tennis twice and did yoga since the last session. Did well but had on and off symptoms. OBJECTIVE:     Exercise/Equipment Resistance/Repetitions Other comments   TG 6 min           TA set    BKF  BP cuff          bridge 10x5\" HEP   Clam #1 x10 RLE HEP          Supine lumbopelvic stabilization     PHE  HEP   Supine hip neuro re-ed 5 min    Side-lying hip ER  HEP   Supine clams  SL hip ER with stabilization   Green TB        Half kneel posterior sling x15 B    Tall kneel ball squeeze with S' H' Abd x15 B           3-way hip flexor stretch  EC, hip stack       Hip stack     Standing hip abduction 10#   Multif press Maroon SC   B shoulder extension Maroon SC   Dowel linda training  Doorway squat  STS, standing squat     Other Therapeutic Activities:  Pt educated on biomechanics of lumbar spine, SIJ and pelvis. Pt also educated on extension rotation syndrome of lumbar spine. Pt also educated on lateral hip stabilization and how to achieve.      Manual Treatments:    Hip LAD grade IV B (with change in SLR);; ; ; ; ; hit tech to R greater trochanter; prone leg lift pelvic innominate correction mobilization grade IV R     Modalities:

## 2019-08-01 ENCOUNTER — HOSPITAL ENCOUNTER (OUTPATIENT)
Dept: PHYSICAL THERAPY | Age: 64
Setting detail: THERAPIES SERIES
Discharge: HOME | End: 2019-08-01
Payer: COMMERCIAL

## 2019-08-01 PROCEDURE — 97140 MANUAL THERAPY 1/> REGIONS: CPT

## 2019-08-01 PROCEDURE — 97110 THERAPEUTIC EXERCISES: CPT

## 2019-08-07 ENCOUNTER — HOSPITAL ENCOUNTER (OUTPATIENT)
Dept: PHYSICAL THERAPY | Age: 64
Setting detail: THERAPIES SERIES
Discharge: HOME | End: 2019-08-07
Payer: COMMERCIAL

## 2019-08-07 PROCEDURE — 97140 MANUAL THERAPY 1/> REGIONS: CPT

## 2019-08-07 PROCEDURE — 97110 THERAPEUTIC EXERCISES: CPT

## 2019-08-07 NOTE — FLOWSHEET NOTE
Physical Therapy Daily Treament Note    Date:  2019    Patient Name:  Anna Hrebert    :  1955  MRN: [id number]  Restrictions/Precautions:    Medical/Treatment Diagnosis Information:  · Diagnosis: lumbar OA  Insurance/Certification information:  PT Insurance Information: Med [place]  Physician Information:  Referring Practitioner: Gabi Curiel MD  Plan of care signed (Y/N):  N  Visit# / total visits:   (12 previous this year)    G-Code (if applicable):         PT G-Codes  Functional Assessment Tool Used: Modified Oswestry  Score: 2 (16 at eval)    Time in:   9:05      Timed Treatment: 30 Total Treatment Time: 30  ________________________________________________________________________________________    Pain Level:    /10  SUBJECTIVE:  Pt reports that she will have moments where the pain comes on strong but then works itself out. Most times she feels pretty good. OBJECTIVE:     Exercise/Equipment Resistance/Repetitions Other comments   TG 6 min           TA set    BKF  BP cuff          bridge 10x5\" HEP   Clam #1  HEP          Supine lumbopelvic stabilization     PHE  HEP   Supine hip neuro re-ed 5 min BLE    Side-lying hip ER  HEP   Supine clams  SL hip ER with stabilization   Green TB        Half kneel posterior sling x15 B    Tall kneel ball squeeze with S' H' Abd x15 B    Treadmill training 4 min    3-way hip flexor stretch  EC, hip stack       Hip stack     Standing hip abduction 10#   Multif press Maroon SC   B shoulder extension Maroon SC   Dowel linda training  Doorway squat  STS, standing squat     Other Therapeutic Activities:  Pt educated on biomechanics of lumbar spine, SIJ and pelvis. Pt also educated on extension rotation syndrome of lumbar spine. Pt also educated on lateral hip stabilization and how to achieve.      Manual Treatments:    Hip LAD grade IV B (with change in SLR); shotgun technique with cavitation; ; ; ; prone SI correction grade V with cavitation;     Modalities:

## 2019-08-09 ENCOUNTER — HOSPITAL ENCOUNTER (OUTPATIENT)
Dept: PHYSICAL THERAPY | Age: 64
Setting detail: THERAPIES SERIES
Discharge: HOME | End: 2019-08-09
Payer: COMMERCIAL

## 2019-08-09 PROCEDURE — 97110 THERAPEUTIC EXERCISES: CPT

## 2019-08-09 NOTE — FLOWSHEET NOTE
Physical Therapy Daily Treament Note    Date:  2019    Patient Name:  Anna Herbert    :  1955  MRN: [id number]  Restrictions/Precautions:    Medical/Treatment Diagnosis Information:  · Diagnosis: lumbar OA  Insurance/Certification information:  PT Insurance Information: [employer] [place]  Physician Information:  Referring Practitioner: Gabi Curiel MD  Plan of care signed (Y/N):  N  Visit# / total visits:   (12 previous this year)    G-Code (if applicable):         PT G-Codes  Functional Assessment Tool Used: Modified Oswestry  Score: 2 (16 at eval)    Time in:   3:00     Timed Treatment: 30 Total Treatment Time: 30  ________________________________________________________________________________________    Pain Level:    /10  SUBJECTIVE:  Nothing new to report. OBJECTIVE:     Exercise/Equipment Resistance/Repetitions Other comments   TG 6 min With UEs          TA set    BKF  BP cuff          bridge 10x5\" HEP   Clam #1  HEP          Supine lumbopelvic stabilization     PHE  HEP   Supine hip neuro re-ed 5 min BLE    Side-lying hip ER  HEP   Magnetic click 4R24\"    Supine clams  SL hip ER with stabilization   Green TB   Supine leg lowering 8 min With BP cuff   Half kneel posterior sling    Tall kneel ball squeeze with S' H' Abd    Treadmill training    3-way hip flexor stretch  EC, hip stack       Hip stack     Standing hip abduction 10#   Multif press Maroon SC   B shoulder extension Maroon SC   Dowel linda training  Doorway squat  STS, standing squat     Other Therapeutic Activities:  Pt educated on biomechanics of lumbar spine, SIJ and pelvis. Pt also educated on extension rotation syndrome of lumbar spine. Pt also educated on lateral hip stabilization and how to achieve.      Manual Treatments:    Hip LAD grade IV B (with change in SLR); shotgun technique with cavitation; ; ; ; prone SI correction grade V with cavitation;     Modalities:      Test/Measurements:      PROM RLE (degrees)  R Hip

## 2019-08-14 ENCOUNTER — HOSPITAL ENCOUNTER (OUTPATIENT)
Dept: PHYSICAL THERAPY | Age: 64
Setting detail: THERAPIES SERIES
Discharge: HOME | End: 2019-08-14
Payer: COMMERCIAL

## 2019-08-14 PROCEDURE — 97110 THERAPEUTIC EXERCISES: CPT

## 2019-08-14 PROCEDURE — 97140 MANUAL THERAPY 1/> REGIONS: CPT

## 2019-08-16 ENCOUNTER — APPOINTMENT (OUTPATIENT)
Dept: PHYSICAL THERAPY | Age: 64
End: 2019-08-16
Payer: COMMERCIAL

## 2019-08-22 ENCOUNTER — HOSPITAL ENCOUNTER (OUTPATIENT)
Dept: PHYSICAL THERAPY | Age: 64
Setting detail: THERAPIES SERIES
Discharge: HOME | End: 2019-08-22
Payer: COMMERCIAL

## 2019-08-22 PROCEDURE — 97110 THERAPEUTIC EXERCISES: CPT

## 2019-08-22 PROCEDURE — 97140 MANUAL THERAPY 1/> REGIONS: CPT

## 2019-08-22 NOTE — FLOWSHEET NOTE
cavitation;  prone hip flexor stretch with and without pelvic oscillations BLE; ; ; prone SI correction grade V with cavitation; standing lumbar drop;     Modalities:      Test/Measurements:      PROM RLE (degrees)  R Hip External Rotation 0-45: 0-20*  R Hip Internal Rotation 0-45: 0-30*  PROM LLE (degrees)  L Hip External Rotation 0-45: 0-40*  L Hip Internal Rotation 0-45: 0-40*  Spine  Lumbar: Flexion and Extension WFL; Sidebending - WFL B     Strength RLE  Comment: hip ER 4/5, hip ABD 4/5, PGM 4-/5, glut max 4-/5  Strength LLE  Comment: hip ER 4+/5, hip ABD 4/5, PGM 4-/5, glut max 4-/5    Landmarks: Supine - R ASIS inferior, RLE longer; Prone - R PSIS inferior       ASSESSMENT:  Pt tolerated session well. Pt demonstrated equal B hip ROM [facility] end of session. Progressed well this session without complaint. Treatment/Activity Tolerance:   [x] Patient tolerated treatment well [] Patient limited by fatique  [] Patient limited by pain [] Patient limited by other medical complications  [] Other:     Goals:    Short term goals  Time Frame for Short term goals: 4 weeks  Short term goal 1: pt will be indep in HEP   Short term goal 2: pt will decrease pain 50%  Short term goal 3: pt will increase B hip strength to 4+/5  Short term goal 4: pt will return to clinic with symmetrical pelvic alignment   Short term goal 5: pt will return to tennis without limitations          Plan: [x] Continue per plan of care [] Alter current plan (see comments)   [] Plan of care initiated [] Hold pending MD visit [] Discharge      Plan for Next Session:  Biomechanical correction of problems as they present. Facilitate correct muscle firing patterns and activation with appropriate activities. Progress patient as tolerated.      Re-Certification Due Date:         Signature:  Rut Knight PT

## 2019-08-27 ENCOUNTER — TELEPHONE (OUTPATIENT)
Dept: INTERNAL MEDICINE CLINIC | Age: 64
End: 2019-08-27

## 2019-08-27 ENCOUNTER — HOSPITAL ENCOUNTER (OUTPATIENT)
Dept: PHYSICAL THERAPY | Age: 64
Setting detail: THERAPIES SERIES
Discharge: HOME | End: 2019-08-27
Payer: COMMERCIAL

## 2019-08-30 ENCOUNTER — HOSPITAL ENCOUNTER (OUTPATIENT)
Dept: PHYSICAL THERAPY | Age: 64
Setting detail: THERAPIES SERIES
Discharge: HOME | End: 2019-08-30
Payer: COMMERCIAL

## 2019-08-30 PROCEDURE — 97110 THERAPEUTIC EXERCISES: CPT

## 2019-09-03 ENCOUNTER — TELEPHONE (OUTPATIENT)
Dept: INTERNAL MEDICINE CLINIC | Age: 64
End: 2019-09-03

## 2019-10-17 ENCOUNTER — TELEPHONE (OUTPATIENT)
Dept: INTERNAL MEDICINE CLINIC | Age: 64
End: 2019-10-17

## 2020-01-29 ENCOUNTER — TELEPHONE (OUTPATIENT)
Dept: INTERNAL MEDICINE CLINIC | Age: 65
End: 2020-01-29

## 2020-01-29 NOTE — TELEPHONE ENCOUNTER
Tingling in finger tips and toes   It happens when she is active or inactive   Her hands and feet are freezing - even when she ws in [place]   Today has a cramp feeling under left breast - it has her worried  What to do?

## 2020-01-30 PROBLEM — M18.11 ARTHRITIS OF CARPOMETACARPAL (CMC) JOINT OF RIGHT THUMB: Status: RESOLVED | Noted: 2018-08-17 | Resolved: 2020-01-30

## 2020-01-30 PROBLEM — M18.12 ARTHRITIS OF CARPOMETACARPAL (CMC) JOINT OF LEFT THUMB: Status: RESOLVED | Noted: 2018-09-18 | Resolved: 2020-01-30

## 2020-01-31 DIAGNOSIS — Z00.00 ROUTINE GENERAL MEDICAL EXAMINATION AT A HEALTH CARE FACILITY: ICD-10-CM

## 2020-01-31 DIAGNOSIS — E78.5 HYPERLIPIDEMIA, UNSPECIFIED HYPERLIPIDEMIA TYPE: ICD-10-CM

## 2020-01-31 LAB
A/G RATIO: 2 (ref 1.1–2.2)
ALBUMIN SERPL-MCNC: 4.7 G/DL (ref 3.4–5)
ALP BLD-CCNC: 88 U/L (ref 40–129)
ALT SERPL-CCNC: 14 U/L (ref 10–40)
ANION GAP SERPL CALCULATED.3IONS-SCNC: 12 MMOL/L (ref 3–16)
AST SERPL-CCNC: 17 U/L (ref 15–37)
BASOPHILS ABSOLUTE: 0.1 K/UL (ref 0–0.2)
BASOPHILS RELATIVE PERCENT: 1.3 %
BILIRUB SERPL-MCNC: <0.2 MG/DL (ref 0–1)
BUN BLDV-MCNC: 15 MG/DL (ref 7–20)
CALCIUM SERPL-MCNC: 9.5 MG/DL (ref 8.3–10.6)
CHLORIDE BLD-SCNC: 103 MMOL/L (ref 99–110)
CHOLESTEROL, TOTAL: 229 MG/DL (ref 0–199)
CO2: 28 MMOL/L (ref 21–32)
CREAT SERPL-MCNC: 0.8 MG/DL (ref 0.6–1.2)
EOSINOPHILS ABSOLUTE: 0.1 K/UL (ref 0–0.6)
EOSINOPHILS RELATIVE PERCENT: 1.1 %
GFR AFRICAN AMERICAN: >60
GFR NON-AFRICAN AMERICAN: >60
GLOBULIN: 2.3 G/DL
GLUCOSE BLD-MCNC: 91 MG/DL (ref 70–99)
HCT VFR BLD CALC: 41.6 % (ref 36–48)
HDLC SERPL-MCNC: 82 MG/DL (ref 40–60)
HEMOGLOBIN: 13.8 G/DL (ref 12–16)
LDL CHOLESTEROL CALCULATED: 127 MG/DL
LYMPHOCYTES ABSOLUTE: 1.1 K/UL (ref 1–5.1)
LYMPHOCYTES RELATIVE PERCENT: 23.4 %
MCH RBC QN AUTO: 31.5 PG (ref 26–34)
MCHC RBC AUTO-ENTMCNC: 33.2 G/DL (ref 31–36)
MCV RBC AUTO: 94.9 FL (ref 80–100)
MONOCYTES ABSOLUTE: 0.3 K/UL (ref 0–1.3)
MONOCYTES RELATIVE PERCENT: 6.3 %
NEUTROPHILS ABSOLUTE: 3.1 K/UL (ref 1.7–7.7)
NEUTROPHILS RELATIVE PERCENT: 67.9 %
PDW BLD-RTO: 13.1 % (ref 12.4–15.4)
PLATELET # BLD: 189 K/UL (ref 135–450)
PMV BLD AUTO: 7.7 FL (ref 5–10.5)
POTASSIUM SERPL-SCNC: 4.2 MMOL/L (ref 3.5–5.1)
RBC # BLD: 4.39 M/UL (ref 4–5.2)
SODIUM BLD-SCNC: 143 MMOL/L (ref 136–145)
TOTAL PROTEIN: 7 G/DL (ref 6.4–8.2)
TRIGL SERPL-MCNC: 101 MG/DL (ref 0–150)
TSH SERPL DL<=0.05 MIU/L-ACNC: 1.96 UIU/ML (ref 0.27–4.2)
VLDLC SERPL CALC-MCNC: 20 MG/DL
WBC # BLD: 4.6 K/UL (ref 4–11)

## 2020-02-04 ENCOUNTER — OFFICE VISIT (OUTPATIENT)
Dept: INTERNAL MEDICINE CLINIC | Age: 65
End: 2020-02-04
Payer: COMMERCIAL

## 2020-02-04 VITALS
HEIGHT: 64 IN | SYSTOLIC BLOOD PRESSURE: 110 MMHG | BODY MASS INDEX: 21.17 KG/M2 | RESPIRATION RATE: 12 BRPM | HEART RATE: 60 BPM | WEIGHT: 124 LBS | DIASTOLIC BLOOD PRESSURE: 70 MMHG

## 2020-02-04 LAB
BILIRUBIN, POC: NORMAL
BLOOD URINE, POC: NORMAL
CLARITY, POC: CLEAR
COLOR, POC: YELLOW
GLUCOSE URINE, POC: NORMAL
KETONES, POC: NORMAL
LEUKOCYTE EST, POC: NORMAL
NITRITE, POC: NORMAL
PH, POC: 6
PROTEIN, POC: NORMAL
SPECIFIC GRAVITY, POC: 1
UROBILINOGEN, POC: NORMAL

## 2020-02-04 PROCEDURE — 81002 URINALYSIS NONAUTO W/O SCOPE: CPT | Performed by: INTERNAL MEDICINE

## 2020-02-04 PROCEDURE — 93000 ELECTROCARDIOGRAM COMPLETE: CPT | Performed by: INTERNAL MEDICINE

## 2020-02-04 PROCEDURE — 99396 PREV VISIT EST AGE 40-64: CPT | Performed by: INTERNAL MEDICINE

## 2020-02-04 NOTE — PROGRESS NOTES
Collin Becerrily 60 y.o. presents today with   Chief Complaint   Patient presents with    Annual Exam       Family History   Problem Relation Age of Onset    Hypertension Father 80        Alive, hypertension.  Other Mother 80        Alive, in good health. Social History     Socioeconomic History    Marital status:      Spouse name: Ramiro Oquendo Number of children: 3    Years of education: Not on file    Highest education level: Not on file   Occupational History    Occupation: She is a teacher.    Social Needs    Financial resource strain: Not on file    Food insecurity:     Worry: Not on file     Inability: Not on file    Transportation needs:     Medical: Not on file     Non-medical: Not on file   Tobacco Use    Smoking status: Former Smoker     Packs/day: 1.00     Years: 2.00     Pack years: 2.00     Types: Cigarettes     Last attempt to quit: 1998     Years since quittin.1    Smokeless tobacco: Never Used   Substance and Sexual Activity    Alcohol use: Yes     Comment: 0-1 drinks per week    Drug use: No    Sexual activity: Not on file   Lifestyle    Physical activity:     Days per week: Not on file     Minutes per session: Not on file    Stress: Not on file   Relationships    Social connections:     Talks on phone: Not on file     Gets together: Not on file     Attends Orthodoxy service: Not on file     Active member of club or organization: Not on file     Attends meetings of clubs or organizations: Not on file     Relationship status: Not on file    Intimate partner violence:     Fear of current or ex partner: Not on file     Emotionally abused: Not on file     Physically abused: Not on file     Forced sexual activity: Not on file   Other Topics Concern    Not on file   Social History Narrative    Living Will:  No.               Patient Active Problem List   Diagnosis    Hyperlipidemia    Narcolepsy       Past Medical History:   Diagnosis Date    Arthritis     Cholelithiases May, 2013    By ultrasound    Encounter for cervical Pap smear with pelvic exam *June 6, 2018    Dr. Dafne Delgado    Hyperlipidemia     Meniere's disease     Migraine     Narcolepsy *2016    Dr. Wolfgang Cason Narcolepsy     Osteopenia DEXA-July, 2012    Lumbar T score +0.2 and Hip T score 0.0    Other screening mammogram 2010    Per Dr. Dafne Delgado.      Other screening mammogram January 19, 2012    Negative    Other screening mammogram May 31, 2013    Negative    Other screening mammogram May 29, 2014    Negative    Other screening mammogram *May 24, 2017    Benign ( Dr. Dafne Delgado )    PONV (postoperative nausea and vomiting)     Screening mammogram, encounter for *May 25, 2018    Benign ( Dr. Dafne Delgado )    Screening mammogram, encounter for *July 2, 2019    Negative ( Dr. Dafne Delgado )        Past Surgical History:   Procedure Laterality Date    COLONOSCOPY  May, 2001 ( 2011 )    Dr. Paz Traore - normal.    COLONOSCOPY  Nov., 2012 ( 2022 )    Shazia Linares - Normal    NASAL SEPTUM SURGERY  1983    OTHER SURGICAL HISTORY  *July, 2017    Dr. Theodora Mauro - excision of a mcous retention cyst    OTHER SURGICAL HISTORY Left 08/06/2018    rt thumb carpometacarpal arthroplasty/flexicarp radialis tendon interposition     CA REPAIR INTERCARP/CARP-METACARP JT Right 8/6/2018    RIGHT THUMB CARPOMETACARPAL ARTHROPLASTY, FLEXOR CARPI RADIALIS TENDON INTERPOSITION; LEFT THUMB CARPOMETACARPAL CORTISONE INJECTION performed by Bard Lila MD at [address] [place] [address]  Nov., 2010    Dr. Chata Bryant - right       Current Outpatient Medications   Medication Sig Dispense Refill    sertraline (ZOLOFT) 100 MG tablet TAKE 1 TABLET BY MOUTH ONE TIME A DAY  30 tablet 10    estradiol (ESTRACE) 1 MG tablet Take 1 mg by mouth daily      diclofenac sodium (VOLTAREN) 1 % GEL Use as needed in affected joints- 1 gram-  BID-  MAx dose 12 grams a day (Patient taking differently: 2 g as needed Use as needed in affected joints- 1 gram-  BID-  MAx dose 12 grams a day) 2 Tube 0    methylphenidate (RITALIN LA) 20 MG extended release capsule Take 20 mg by mouth every morning .  modafinil (PROVIGIL) 100 MG tablet Take 150 mg by mouth daily.  PROGESTERONE by Does not apply route daily.  multivitamin (THERAGRAN) per tablet Take 1 tablet by mouth daily.  Glucosamine 500 MG TABS Take  by mouth daily. No current facility-administered medications for this visit. Allergies   Allergen Reactions    Yeast-Related Products        Review of Systems:     Immunization History   Administered Date(s) Administered    Td, unspecified formulation 09/01/2010    Zoster Recombinant (Shingrix) 12/02/2018     Eye Exam:  July, 2019 by Dr. Page Lang  Chest: Denies: cough, hemoptysis, shortness of breath, pleuritic chest pain, wheezing  Cardiovascular: Denies: chest pain, dyspnea on exertion, orthopnea, paroxysmal nocturnal dyspnea, edema, palpitations  Abdomen: no abdominal pain, change in bowel habits, or black or bloody stools  Colonoscopy:  November, 2012 by Dr. Heidy Tolbert was normal.  To be repeated 2022  Fall Risk low  ADL   Without assistance  Mammography:July 2, 2019  DEXA: July, 2012 revealed a lumbar T score of +0.2 and a hip T score of 0.0  Pelvic and PAP: August, 2019 by Dr. Shivani Kenney  Other: For the last month or so she has had tingling in both feet and burning pain      Physical Exam:  General appearance: alert, appears stated age and cooperative  /72 (Site: Left Upper Arm, Position: Sitting, Cuff Size: Medium Adult)   Pulse 60   Ht 5' 4\" (1.626 m)   Wt 124 lb (56.2 kg)   BMI 21.28 kg/m²   Eyes: conjunctivae/corneas clear. PERRL, EOM's intact. Fundi benign. Ears: normal TM's and external ear canals both ears  Nose: Nares normal. Septum midline. Mucosa normal. No drainage or sinus tenderness.   Throat: no abnormalities  Neck: no adenopathy, no carotid bruit, no JVD, supple, symmetrical, trachea midline and thyroid not enlarged, symmetric, no tenderness/mass/nodules  Nodes:no adenopathy palpable  Breasts:Breasts symmetrical, skin without lesion(s), no nipple retraction or dimpling, no nipple discharge, no masses palpated, no axillary or supraclavicular adenopathy  Lungs: clear to auscultation bilaterally  Heart: regular rate and rhythm, S1, S2 normal, no murmur, click, rub or gallop  Abdomen: soft, non-tender; bowel sounds normal; no masses,  no organomegaly  Extremities: extremities normal, atraumatic, no cyanosis or edema  Neurological: Gait normal. Reflexes normal and symmetric.  Sensation grossly normal  Pulse: radial=4/4, femoral=4/4, popliteal=4/4, dorsalis pedis=4/4,   Skin: normal coloration and turgor, no rashes, no suspicious skin lesions noted  Psych: normal    Lab Review:   Orders Only on 01/31/2020   Component Date Value    TSH 01/31/2020 1.96     Cholesterol, Total 01/31/2020 229*    Triglycerides 01/31/2020 101     HDL 01/31/2020 82*    LDL Calculated 01/31/2020 127*    VLDL Cholesterol Calcula* 01/31/2020 20     Sodium 01/31/2020 143     Potassium 01/31/2020 4.2     Chloride 01/31/2020 103     CO2 01/31/2020 28     Anion Gap 01/31/2020 12     Glucose 01/31/2020 91     BUN 01/31/2020 15     CREATININE 01/31/2020 0.8     GFR Non- 01/31/2020 >60     GFR  01/31/2020 >60     Calcium 01/31/2020 9.5     Total Protein 01/31/2020 7.0     Alb 01/31/2020 4.7     Albumin/Globulin Ratio 01/31/2020 2.0     Total Bilirubin 01/31/2020 <0.2     Alkaline Phosphatase 01/31/2020 88     ALT 01/31/2020 14     AST 01/31/2020 17     Globulin 01/31/2020 2.3     WBC 01/31/2020 4.6     RBC 01/31/2020 4.39     Hemoglobin 01/31/2020 13.8     Hematocrit 01/31/2020 41.6     MCV 01/31/2020 94.9     MCH 01/31/2020 31.5     MCHC 01/31/2020 33.2     RDW 01/31/2020 13.1     Platelets 96/61/7231 189     MPV 01/31/2020 7.7     Neutrophils % 01/31/2020 67.9     Lymphocytes % 01/31/2020 23.4     Monocytes % 01/31/2020 6.3     Eosinophils % 01/31/2020 1.1     Basophils % 01/31/2020 1.3     Neutrophils Absolute 01/31/2020 3.1     Lymphocytes Absolute 01/31/2020 1.1     Monocytes Absolute 01/31/2020 0.3     Eosinophils Absolute 01/31/2020 0.1     Basophils Absolute 01/31/2020 0.1      Assessment: Stable    Plan:              ECG, UA, and hemoccults  EMG  Hyperlipidemia - .labs acceptable, continue diet, exercise, and weight loss  Narco;epsy - chronic, continie medicine managed by sleep doctor      Estefania Brice MD

## 2020-02-05 ENCOUNTER — HOSPITAL ENCOUNTER (OUTPATIENT)
Dept: NEUROLOGY | Age: 65
Discharge: HOME | End: 2020-02-05
Payer: COMMERCIAL

## 2020-02-05 PROCEDURE — 95909 NRV CNDJ TST 5-6 STUDIES: CPT | Performed by: PHYSICAL MEDICINE & REHABILITATION

## 2020-02-05 PROCEDURE — 95885 MUSC TST DONE W/NERV TST LIM: CPT | Performed by: PHYSICAL MEDICINE & REHABILITATION

## 2020-02-05 NOTE — PROCEDURES
[address]      Electrodiagnostic Report  Test Date:  2020    Patient: Debi Kumar : 1955 Physician: Marv Thakur D.O.   Sex: Male Height:   Ref Phys: Britney Sy MD     Patient Complaints:  Patient is a 60year-old male who presents with back pain onset 9 mos ago. Patient with paresthesias of hands and feet. onset 6 weeks ago. Patient History / Exam:  PMH: no endocrine disease no back or leg surgery + right great toe surgery and right CMC joint surgery PE: reflexes 1+, normal strength    Impression: study is consistent with mild left tarsal tunnel syndrome, no evidence of a generalized peripheral neuropathy or other entrapment neuropathy. Thank you. NCV & EMG Findings:  Evaluation of the left tibial motor nerve showed prolonged distal onset latency (6.3 ms). The left medial plantar mixed nerve showed prolonged distal peak latency (5.2 ms) and reduced amplitude (4.5 µV). The right medial plantar mixed nerve showed reduced amplitude (6.1 µV). All remaining nerves (as indicated in the following tables) were within normal limits. All left vs. right side differences were within normal limits. All examined muscles (as indicated in the following table) showed no evidence of electrical instability.                 Marv Thakur D.O.        Nerve Conduction Studies  Anti Sensory Summary Table     Stim Site NR Peak (ms) Norm Peak (ms) P-T Amp (µV) Norm P-T Amp Site1 Site2 Delta-P (ms) Dist (cm) Sagar (m/s) Norm Sagar (m/s)   Left Sural Anti Sensory (Lat Mall)   Calf    3.7 <4.2 12.0 >5.0 Calf Lat Mall 3.7 14.0 38      Motor Summary Table     Stim Site NR Onset (ms) Norm Onset (ms) O-P Amp (mV) Norm O-P Amp Site1 Site2 Delta-0 (ms) Dist (cm) Sagar (m/s) Norm Sagar (m/s)   Left Peroneal Motor (Ext Dig Brev)   Ankle    4.8 <5.5 2.9 >2.5 B Fib Ankle 6.1 33.0 54 >40   B Fib    10.9  2.8          Right Peroneal Motor (Ext Dig Brev)   Ankle    4.4 <5.5 5.1 >2.5 B Fib Ankle 5.8 30.0 52 >40   B Fib    10.2  4.5          Left Tibial Motor (Abd Rodriguez Brev)   Ankle    6.3 <6.2 3.2 >3.0         Right Tibial Motor (Abd Rodriguez Brev)   Ankle    5.5 <6.2 5.0 >3.0           Mixed Summary Table     Stim Site NR Peak (ms) Norm Peak (ms) P-T Amp (µV) Norm P-T Amp Site1 Site2 Delta-P (ms) Dist (cm) Sagar (m/s) Norm Sagar (m/s)   Left Medial Plantar Mixed (Med Malleolus)   Medial Foot    5.2 <3.7 4.5 >10         Right Medial Plantar Mixed (Med Malleolus)   Medial Foot    3.6 <3.7 6.1 >10           EMG     Side Muscle Nerve Root Ins Act Fibs Psw Amp Dur Poly Recrt Int Belita Drilling Comment   Left GluteusMed SupGluteal L5-S1 Nml Nml Nml Nml Nml 0 Nml Nml    Left GluteusMax InfGluteal L5-S2 Nml Nml Nml Nml Nml 0 Nml Nml    Left VastusMed Femoral L2-4 Nml Nml Nml Nml Nml 0 Nml Nml    Left AntTibialis Dp Br Fibular L4-5 Nml Nml Nml Nml Nml 0 Nml Nml    Left Peroneus Long Sup Br Fibular L5-S1 Nml Nml Nml Nml Nml 0 Nml Nml    Left Gastroc Tibial S1-2 Nml Nml Nml Nml Nml 0 Nml Nml    Left PostTibialis Tibial L5, S1 Nml Nml Nml Nml Nml 0 Nml Nml    Left ExtHallLong Dp Br Fibular L5, S1 Nml Nml Nml Nml Nml 0 Nml Nml    Left Ext Dig Brev Dp Br Fibular L5, S1 Nml Nml Nml Nml Nml 0 Nml Nml    Left Lumbo Parasp Up Rami L1-2 Nml Nml Nml         Left Lumbo Parasp Mid Rami L3-4 Nml Nml Nml         Left Lumbo Parasp Low Rami L5-S1 Nml Nml Nml         Right GluteusMed SupGluteal L5-S1 Nml Nml Nml Nml Nml 0 Nml Nml    Right VastusMed Femoral L2-4 Nml Nml Nml Nml Nml 0 Nml Nml    Right AntTibialis Dp Br Fibular L4-5 Nml Nml Nml Nml Nml 0 Nml Nml    Right Peroneus Long Sup Br Fibular L5-S1 Nml Nml Nml Nml Nml 0 Nml Nml    Right Gastroc Tibial S1-2 Nml Nml Nml Nml Nml 0 Nml Nml    Right PostTibialis Tibial L5, S1 Nml Nml Nml Nml Nml 0 Nml Nml    Right ExtHallLong Dp Br Fibular L5, S1 Nml Nml Nml Nml Nml 0 Nml Nml    Right Ext Dig Brev Dp Br Fibular L5, S1 Nml Nml Nml Nml Nml 0 Nml Nml    Right Lumbo Parasp Up Rami L1-2 Nml Nml Nml Right Lumbo Parasp Mid Rami L3-4 Nml Nml Nml         Right Lumbo Parasp Low Rami L5-S1 Nml Nml Nml           Electronically signed by Kingsley Palomino DO on 2/5/2020 at 1:18 PM

## 2020-02-06 ENCOUNTER — TELEPHONE (OUTPATIENT)
Dept: INTERNAL MEDICINE CLINIC | Age: 65
End: 2020-02-06

## 2020-02-10 LAB
CONTROL: NORMAL
HEMOCCULT STL QL: NORMAL

## 2020-02-10 PROCEDURE — 82274 ASSAY TEST FOR BLOOD FECAL: CPT | Performed by: INTERNAL MEDICINE

## 2020-03-23 RX ORDER — SERTRALINE HYDROCHLORIDE 100 MG/1
TABLET, FILM COATED ORAL
Qty: 90 TABLET | Refills: 3 | Status: SHIPPED | OUTPATIENT
Start: 2020-03-23

## 2020-05-04 ENCOUNTER — TELEPHONE (OUTPATIENT)
Dept: INTERNAL MEDICINE CLINIC | Age: 65
End: 2020-05-04

## 2020-05-04 NOTE — TELEPHONE ENCOUNTER
Headache since Saturday morning. Fever (usually runs low about 97)  She's been 97.6 - 98.6. \"She feels it\"  Taking two Tylenol and it seems to be helping both. Her friend told her to call. She doesn't think she needs to talk to you - she just called because her daughter and friend told her to.

## 2021-04-16 ENCOUNTER — OFFICE VISIT (OUTPATIENT)
Dept: ORTHOPEDIC SURGERY | Age: 66
End: 2021-04-16
Payer: COMMERCIAL

## 2021-04-16 DIAGNOSIS — S90.511A ABRASION OF RIGHT ANKLE, INITIAL ENCOUNTER: Primary | ICD-10-CM

## 2021-04-16 PROCEDURE — 99212 OFFICE O/P EST SF 10 MIN: CPT | Performed by: ORTHOPAEDIC SURGERY

## 2021-04-16 RX ORDER — DOXYCYCLINE HYCLATE 100 MG
100 TABLET ORAL 2 TIMES DAILY
COMMUNITY
Start: 2021-04-15

## 2021-04-20 NOTE — PROGRESS NOTES
[facility]  Outpatient Progress Note  Carlos Boxer L. Dorsie Radar, MD    Patient Name: Samuel Shore MRN: [id number]   Age: 63 y.o. YOB: 1955   Sex: female      [address] Complaint   Patient presents with    Foot Pain     Right foot- blister infected medial ankle ref pcp x 1 week, states infections        HISTORY OF PRESENT ILLNESS   Samuel Shore is a 63 y.o. female who wore a new pair of gardening boots about a week ago and rubbed a blister on her medial ankle that looked red and inflamed so her primary care doctor put her on some antibiotics and recommended she come in for orthopedic consultation. Patient reports redness and swelling are much improved. No drainage. No fevers or chills. PAST MEDICAL HISTORY      Past Medical History:   Diagnosis Date    Arthritis     Cholelithiases May, 2013    By ultrasound    Encounter for cervical Pap smear with pelvic exam *June 6, 2018    Dr. Jeffrey Garcia    Hyperlipidemia     Meniere's disease     Migraine     Narcolepsy *2016    Dr. Leonidas Moritz Narcolepsy     Osteopenia DEXA-July, 2012    Lumbar T score +0.2 and Hip T score 0.0    Other screening mammogram 2010    Per Dr. Jeffrey Garcia.      Other screening mammogram January 19, 2012    Negative    Other screening mammogram May 31, 2013    Negative    Other screening mammogram May 29, 2014    Negative    Other screening mammogram *May 24, 2017    Benign ( Dr. Jeffrey Garcia )    PONV (postoperative nausea and vomiting)     Screening mammogram, encounter for *May 25, 2018    Benign ( Dr. Jeffrey Garcia )    Screening mammogram, encounter for *July 2, 2019    Negative ( Dr. Jeffrey Garcia )       PAST SURGICAL HISTORY     Past Surgical History:   Procedure Laterality Date    COLONOSCOPY  May, 2001 ( 2011 )    Dr. Eyad Milian - lele.    COLONOSCOPY  Nov., 2012 ( 2022 )     - Leel    NASAL SEPTUM SURGERY  1983    OTHER SURGICAL HISTORY  *July, 2017    Dr. Elena Hameed - excision of a mcous retention cyst    OTHER SURGICAL HISTORY Left 08/06/2018    rt thumb carpometacarpal arthroplasty/flexicarp radialis tendon interposition     OK REPAIR INTERCARP/CARP-METACARP JT Right 8/6/2018    RIGHT THUMB CARPOMETACARPAL ARTHROPLASTY, FLEXOR CARPI RADIALIS TENDON INTERPOSITION; LEFT THUMB CARPOMETACARPAL CORTISONE INJECTION performed by Belle Carias MD at [address] [facility]  Nov., 2010    Dr. Joel Osuna - right       MEDICATIONS     Current Outpatient Medications   Medication Sig Dispense Refill    doxycycline hyclate (VIBRA-TABS) 100 MG tablet Take 100 mg by mouth 2 times daily      sertraline (ZOLOFT) 100 MG tablet TAKE 1 TABLET BY MOUTH ONE TIME A DAY  90 tablet 3    estradiol (ESTRACE) 1 MG tablet Take 1 mg by mouth daily      diclofenac sodium (VOLTAREN) 1 % GEL Use as needed in affected joints- 1 gram-  BID-  MAx dose 12 grams a day (Patient taking differently: 2 g as needed Use as needed in affected joints- 1 gram-  BID-  MAx dose 12 grams a day) 2 Tube 0    methylphenidate (RITALIN LA) 20 MG extended release capsule Take 20 mg by mouth every morning .  modafinil (PROVIGIL) 100 MG tablet Take 150 mg by mouth daily.  PROGESTERONE by Does not apply route daily.  multivitamin (THERAGRAN) per tablet Take 1 tablet by mouth daily.  Glucosamine 500 MG TABS Take  by mouth daily. No current facility-administered medications for this visit. ALLERGIES     Allergies   Allergen Reactions    Yeast-Related Products        FAMILY HISTORY     Family History   Problem Relation Age of Onset    Hypertension Father 80        Alive, hypertension.  Other Mother 80        Alive, in good health.        SOCIAL HISTORY     Social History     Socioeconomic History    Marital status:      Spouse name: Katharine Gowers Number of children: 3    Years of education: Not on file    Highest education level: Not on file   Occupational History    Occupation: lesions  Neurologic:  no focal weakness, numbness/tingling, tremor, or severe headache. See HPI. See HPI for pertinent positives. PHYSICAL EXAM   Vital Signs: There were no vitals filed for this visit. General appearance: healthy, alert, no distress  Skin: Skin color, texture, turgor normal. No rashes or lesions  HEENT: atraumatic, normocephalic. PERRL  Respiratory: Unlabored breathing  Lymphatic: No adenopathy   Neuro: Alert and oriented, normal distal sensation, normal bilateral DTRs  Vascular: Normal distal capillary and distal pulses  Muskuloskeletal Exam: Right ankle examination there is a very superficial abrasion over the medial aspect of the ankle over the medial malleolus without cellulitic change or drainage. No ankle effusion. Full range of motion without pain. IMPRESSION     1. Abrasion of right ankle, initial encounter         PLAN   I had a lengthy discussion with patient today regarding diagnosis and treatment options and recommendations. No evidence for deep infection. Superficial abrasion healing without complication. Instructed on local wound care. FOLLOWUP     Return if symptoms worsen or fail to improve. No orders of the defined types were placed in this encounter. No orders of the defined types were placed in this encounter.       Patient was instructed on appropriate use of braces, participation in home exercise programs, healthy lifestyle choices and weight loss as appropriate     Ramona Stein MD

## 2023-05-04 ENCOUNTER — HOSPITAL ENCOUNTER (OUTPATIENT)
Dept: PHYSICAL THERAPY | Age: 68
Setting detail: THERAPIES SERIES
Discharge: HOME | End: 2023-05-04
Payer: MEDICARE

## 2023-05-04 PROCEDURE — 97161 PT EVAL LOW COMPLEX 20 MIN: CPT

## 2023-05-04 PROCEDURE — 97140 MANUAL THERAPY 1/> REGIONS: CPT

## 2023-05-04 NOTE — FLOWSHEET NOTE
[facility], [facility], [address] [place] [address]  Phone: [phone]  Fax [phone]    Physical Therapy Daily Treatment Note    Date:  2023    Patient Name:  Pavan Conner    :  1955  MRN: [id number]  Medical Diagnosis:  Other intervertebral disc degeneration, lumbar region [M51.36]  Pain in right hip [M25.551]  Pain in left hip [M25.552]  Sacrococcygeal disorders, not elsewhere classified [M53.3]  Other idiopathic scoliosis, thoracolumbar region [M41.25]  Pain in right knee [M25.561]  Pain in left knee [M25.562]  Other chronic pain [G89.29]  Treatment Diagnosis:  pain, decreased mobility  Onset Date:  s/p 1 year                   Referral Date: 2023       Referring Provider: Michelle Michelle, *   Insurance Provider: Chente Van (Kurt Felling)  Restrictions/Precautions:    none  Plan of care signed (Y/N):  sent  Visit# / total visits:   (Sunitha Ortega)    G-Code (if applicable):          JUSTICE     Time in:   1:30      Timed Treatment: 10  Total Treatment Time:  45  ________________________________________________________________________________________    Pain Level:    /10  SUBJECTIVE:  see eval    OBJECTIVE:     Exercise/Equipment Resistance/Repetitions Other comments                                                                                                                                             Other Therapeutic Activities:      Manual Treatments:    RLE leg pull with cavitation. RLE inferior hip distraction grade IV. R hit technique to greater trochanter. Assisted clams.      Modalities:      Test/Measurements:         ASSESSMENT:    see eval     Treatment/Activity Tolerance:   [x]Patient tolerated treatment well [] Patient limited by fatique  []Patient limited by pain [] Patient limited by other medical complications  [] Other:     Goals:    Short term goal 1: Pt will be indep in HEP  Short term

## 2023-05-04 NOTE — THERAPY EVALUATION
Basilio  [facility]. and Therapy, [facility], [address], [address]  Phone: [phone]  Fax [phone]    Dear Referring Practitioner: Dr. Teto Brower,     We had the pleasure of evaluating the following patient for physical therapy services at [facility]. A summary of our findings can be found in the initial assessment below. This includes our plan of care. If you have any questions or concerns regarding these findings, please do not hesitate to contact me at the office phone number. Thank you for the referral.         Physician Signature:_______________________________Date:__________________  By signing above (or electronic signature), therapists plan is approved by physician        LOWER EXTREMITY PHYSICAL THERAPY EVALUATION      Evaluation Date: 5/4/2023    Patient Name: Jamaal Javier   YOB: 1955    Medical Diagnosis:  Other intervertebral disc degeneration, lumbar region [M51.36]  Pain in right hip [M25.551]  Pain in left hip [M25.552]  Sacrococcygeal disorders, not elsewhere classified [M53.3]  Other idiopathic scoliosis, thoracolumbar region [M41.25]  Pain in right knee [M25.561]  Pain in left knee [M25.562]  Other chronic pain [G89.29]  Treatment Diagnosis:  pain, decreased mobility  Onset Date:  s/p 1 year    Referral Date: 5/4/2023   Referring Provider: Alfonso Littlejohn, *   Insurance Provider: Yannick Fernandez (Wilian Ora)  Restrictions/Precautions:    none    SUBJECTIVE FINDINGS    History of Present Illness:  Pt presents with c/o return of lower back and hip symptoms over the last year. Notes that she has done physical therapy recently at [facility]. She notes that she hurt worse following this. She then saw Dr. Teto Brower who referred her here. Pain is on the R side in the SI/buttocks area. Notes that pain does go down to the knee. Does notes L knee pain but nothing else.  Also reports burning that

## 2023-05-19 ENCOUNTER — HOSPITAL ENCOUNTER (OUTPATIENT)
Dept: PHYSICAL THERAPY | Age: 68
Setting detail: THERAPIES SERIES
Discharge: HOME | End: 2023-05-19
Payer: MEDICARE

## 2023-05-23 ENCOUNTER — HOSPITAL ENCOUNTER (OUTPATIENT)
Dept: PHYSICAL THERAPY | Age: 68
Setting detail: THERAPIES SERIES
Discharge: HOME | End: 2023-05-23
Payer: MEDICARE

## 2023-05-23 PROCEDURE — 97140 MANUAL THERAPY 1/> REGIONS: CPT

## 2023-05-23 PROCEDURE — 97110 THERAPEUTIC EXERCISES: CPT

## 2023-05-23 NOTE — FLOWSHEET NOTE
[facility], [facility], [address]  Phone: [phone]  Fax [phone]    Physical Therapy Daily Treatment Note    Date:  2023    Patient Name:  Shelly Liang    :  1955  MRN: [id number]  Medical Diagnosis:  Other intervertebral disc degeneration, lumbar region [M51.36]  Pain in right hip [M25.551]  Pain in left hip [M25.552]  Sacrococcygeal disorders, not elsewhere classified [M53.3]  Other idiopathic scoliosis, thoracolumbar region [M41.25]  Pain in right knee [M25.561]  Pain in left knee [M25.562]  Other chronic pain [G89.29]  Treatment Diagnosis:  pain, decreased mobility  Onset Date:  s/p 1 year                   Referral Date: 2023       Referring Provider: Michelle Li, *   Insurance Provider: Antonietta Abdullahi (Jocelynn Bachelor)  Restrictions/Precautions:    none  Plan of care signed (Y/N):  sent  Visit# / total visits:  / (5 visits through )    G-Code (if applicable):          JUSTICE     Time in:   2:20      Timed Treatment: 40  Total Treatment Time:  40  ________________________________________________________________________________________    Pain Level:    /10  SUBJECTIVE:  Notes that she was sick last week and did nothing. Reports she felt better after lying around. Was able to play tennis this morning and felt okay. OBJECTIVE:     Exercise/Equipment Resistance/Repetitions Other comments   TG 5 min    bridge x10    SL hip ER with stabilization X10 B Green loop   Supine hip neuro re-ed 3 min    Side-lying hip ER X10 R                                                                                                           Other Therapeutic Activities:      Manual Treatments:    RLE leg pull with cavitation. RLE inferior hip distraction grade IV. R hit technique to greater trochanter. Assisted clams. Neural tracing femoral and sciatic nerves RLE.  Prone R hip flexor stretch with

## 2023-05-25 ENCOUNTER — HOSPITAL ENCOUNTER (OUTPATIENT)
Dept: PHYSICAL THERAPY | Age: 68
Setting detail: THERAPIES SERIES
Discharge: HOME | End: 2023-05-25
Payer: MEDICARE

## 2023-05-25 PROCEDURE — 97110 THERAPEUTIC EXERCISES: CPT

## 2023-05-25 PROCEDURE — 97140 MANUAL THERAPY 1/> REGIONS: CPT

## 2023-05-25 NOTE — FLOWSHEET NOTE
[facility], [facility], [address] [place] [address]  Phone: [phone]  Fax [phone]    Physical Therapy Daily Treatment Note    Date:  2023    Patient Name:  Daija Somers    :  1955  MRN: [id number]  Medical Diagnosis:  Other intervertebral disc degeneration, lumbar region [M51.36]  Pain in right hip [M25.551]  Pain in left hip [M25.552]  Sacrococcygeal disorders, not elsewhere classified [M53.3]  Other idiopathic scoliosis, thoracolumbar region [M41.25]  Pain in right knee [M25.561]  Pain in left knee [M25.562]  Other chronic pain [G89.29]  Treatment Diagnosis:  pain, decreased mobility  Onset Date:  s/p 1 year                   Referral Date: 2023       Referring Provider: Michelle Dukes, *   Insurance Provider: Jus Cam ([facility])  Restrictions/Precautions:    none  Plan of care signed (Y/N):  sent  Visit# / total visits:  3/5 (5 visits through )    G-Code (if applicable):          JUSTICE     Time in:   9:50      Timed Treatment: 40  Total Treatment Time:  40  ________________________________________________________________________________________    Pain Level:    /10  SUBJECTIVE:  Notes that she is sleeping with no pain. \"I'm in awe. \" Notes that things have improved. OBJECTIVE:     Exercise/Equipment Resistance/Repetitions Other comments   TG 5 min    bridge x10    SL hip ER with stabilization X10 B Green loop   Supine hip neuro re-ed 3 min    Side-lying hip ER X10 R                                                                                                           Other Therapeutic Activities:      Manual Treatments:    RLE leg pull with cavitation. RLE inferior hip distraction grade IV. R hit technique to greater trochanter. Assisted clams. Neural tracing femoral and sciatic nerves RLE. Prone R hip flexor stretch with and without pelvic oscillation.  Femoral nerve

## 2023-05-30 ENCOUNTER — HOSPITAL ENCOUNTER (OUTPATIENT)
Dept: PHYSICAL THERAPY | Age: 68
Setting detail: THERAPIES SERIES
Discharge: HOME | End: 2023-05-30
Payer: MEDICARE

## 2023-05-30 PROCEDURE — 97140 MANUAL THERAPY 1/> REGIONS: CPT

## 2023-05-30 PROCEDURE — 97110 THERAPEUTIC EXERCISES: CPT

## 2023-05-30 NOTE — FLOWSHEET NOTE
[facility], [facility], [address] [place] [address]  Phone: [phone]  Fax [phone]    Physical Therapy Daily Treatment Note    Date:  2023    Patient Name:  Lolita Hughes    :  1955  MRN: [id number]  Medical Diagnosis:  Other intervertebral disc degeneration, lumbar region [M51.36]  Pain in right hip [M25.551]  Pain in left hip [M25.552]  Sacrococcygeal disorders, not elsewhere classified [M53.3]  Other idiopathic scoliosis, thoracolumbar region [M41.25]  Pain in right knee [M25.561]  Pain in left knee [M25.562]  Other chronic pain [G89.29]  Treatment Diagnosis:  pain, decreased mobility  Onset Date:  s/p 1 year                   Referral Date: 2023       Referring Provider: John Figueroa, *   Insurance Provider: Cal Lee ([facility])  Restrictions/Precautions:    none  Plan of care signed (Y/N):  sent  Visit# / total visits:   (5 visits through )    G-Code (if applicable):          JUSTICE     Time in:   11:20     Timed Treatment: 40  Total Treatment Time:  40  ________________________________________________________________________________________    Pain Level:    /10  SUBJECTIVE:  Notes that she is still doing well but notes that she feels pain in the R posterior buttocks. OBJECTIVE:     Exercise/Equipment Resistance/Repetitions Other comments   TG 5 min    bridge x10    SL hip ER with stabilization Green loop   Prone hip neuro re-ed 3 min    Side-lying hip ER X10 R                  Standing hip stretch X10 B rotation Pelvis moving on femur   Standing hip stack 2x30\" B                                                                               Other Therapeutic Activities:      Manual Treatments:    RLE leg pull with cavitation. RLE inferior hip distraction grade IV. R hit technique to greater trochanter. Assisted clams. Neural tracing femoral and sciatic nerves RLE.  Prone R hip

## 2023-06-02 ENCOUNTER — HOSPITAL ENCOUNTER (OUTPATIENT)
Dept: PHYSICAL THERAPY | Age: 68
Setting detail: THERAPIES SERIES
Discharge: HOME | End: 2023-06-02
Payer: MEDICARE

## 2023-06-02 PROCEDURE — 97140 MANUAL THERAPY 1/> REGIONS: CPT

## 2023-06-02 PROCEDURE — 97110 THERAPEUTIC EXERCISES: CPT

## 2023-06-02 PROCEDURE — 97112 NEUROMUSCULAR REEDUCATION: CPT

## 2023-06-02 NOTE — PROGRESS NOTES
[facility] 79. and Therapy, [facility], [address] [place] [address]  Phone: [phone]  Fax [phone]    Physical Therapy Progress Note    Date:  2023    Patient Name:  Jennifer Benito    :  1955  MRN: [id number]  Medical Diagnosis:  Other intervertebral disc degeneration, lumbar region [M51.36]  Pain in right hip [M25.551]  Pain in left hip [M25.552]  Sacrococcygeal disorders, not elsewhere classified [M53.3]  Other idiopathic scoliosis, thoracolumbar region [M41.25]  Pain in right knee [M25.561]  Pain in left knee [M25.562]  Other chronic pain [G89.29]  Treatment Diagnosis:  pain, decreased mobility  Onset Date:  s/p 1 year                   Referral Date: 2023       Referring Provider: Gisselle Landeros, *   Insurance Provider: Pelon Fabian (Víctor Swyft)  Restrictions/Precautions:    none  Plan of care signed (Y/N):  sent  Visit# / total visits:   (5 visits through )    G-Code (if applicable):          JUSTICE 10/50  19/50 at eval  ________________________________________________________________________________________    Pain Level:    2-3/10  SUBJECTIVE:  Pt reports that she has definitely improved. Notes that the first session helped tremendously and she has been pretty good since. Notes that she is heading in the right direction. Reports that the pain while sleeping has returned. \"It hurts while I'm sleeping. \" Also reports that walking uphill still causes pain. Has not returned to full exercise routine or tennis without symptoms.      OBJECTIVE:   Test/Measurements:      Lumbar Range of Motion/Strength Testing      [x] All WFL except as marked below  ROM (*denotes pain) AROM PROM COMMENTS   Flexion WFL       Extension 50%       Sidebending Left WFL       Sidebending Right WFL       Rotation Left      []   Seated  []   Standing        Rotation Right               []   Seated  []   Standing           Pelvis/Sacral

## 2023-06-02 NOTE — FLOWSHEET NOTE
[facility], [address], [address]   Phone: [phone]  Fax [phone]    Physical Therapy Daily Treatment Note    Date:  2023    Patient Name:  Arnel Merida    :  1955  MRN: [id number]  Medical Diagnosis:  Other intervertebral disc degeneration, lumbar region [M51.36]  Pain in right hip [M25.551]  Pain in left hip [M25.552]  Sacrococcygeal disorders, not elsewhere classified [M53.3]  Other idiopathic scoliosis, thoracolumbar region [M41.25]  Pain in right knee [M25.561]  Pain in left knee [M25.562]  Other chronic pain [G89.29]  Treatment Diagnosis:  pain, decreased mobility  Onset Date:  s/p 1 year                   Referral Date: 2023       Referring Provider: Loreto Doyle, *   Insurance Provider: Benito Vuong (Juan Luis Wagner)  Restrictions/Precautions:    none  Plan of care signed (Y/N):  sent  Visit# / total visits:   (5 visits through )    G-Code (if applicable):          JUSTICE     Time in:   10:00     Timed Treatment: 40  Total Treatment Time:  40  ________________________________________________________________________________________    Pain Level:    /10  SUBJECTIVE:  Pt reports that she has definitely improved. Notes that the first session helped tremendously and she has been pretty good since. Notes that she is heading in the right direction. Reports that the pain while sleeping has returned. \"It hurts while I'm sleeping. \" Also reports that walking uphill still causes pain. Has not returned to full exercise routine or tennis without symptoms.      OBJECTIVE:     Exercise/Equipment Resistance/Repetitions Other comments   TG 5 min    bridge x10    SL hip ER with stabilization Green loop   Prone hip neuro re-ed 3 min    Side-lying hip ER    Quadruped sit back (frog) 5x10\"           Standing hip stretch X10 B rotation Pelvis moving on femur   Standing hip stack 2x30\" B    Standing

## 2023-06-05 ENCOUNTER — APPOINTMENT (OUTPATIENT)
Dept: PHYSICAL THERAPY | Age: 68
End: 2023-06-05
Payer: MEDICARE

## 2023-06-08 ENCOUNTER — HOSPITAL ENCOUNTER (OUTPATIENT)
Dept: PHYSICAL THERAPY | Age: 68
Setting detail: THERAPIES SERIES
Discharge: HOME | End: 2023-06-08
Payer: MEDICARE

## 2023-06-08 PROCEDURE — 97140 MANUAL THERAPY 1/> REGIONS: CPT

## 2023-06-08 PROCEDURE — 97110 THERAPEUTIC EXERCISES: CPT

## 2023-06-08 PROCEDURE — 97112 NEUROMUSCULAR REEDUCATION: CPT

## 2023-06-08 NOTE — FLOWSHEET NOTE
[facility] 79. and Therapy, [facility], [address] [place] [address]  Phone: [phone]  Fax [phone]    Physical Therapy Daily Treatment Note    Date:  2023    Patient Name:  Alma Tate    :  1955  MRN: [id number]  Medical Diagnosis:  Other intervertebral disc degeneration, lumbar region [M51.36]  Pain in right hip [M25.551]  Pain in left hip [M25.552]  Sacrococcygeal disorders, not elsewhere classified [M53.3]  Other idiopathic scoliosis, thoracolumbar region [M41.25]  Pain in right knee [M25.561]  Pain in left knee [M25.562]  Other chronic pain [G89.29]  Treatment Diagnosis:  pain, decreased mobility  Onset Date:  s/p 1 year                   Referral Date: 2023       Referring Provider: Waldo Linder, *   Insurance Provider: Lewis Ying)  Restrictions/Precautions:    none  Plan of care signed (Y/N):  sent  Visit# / total visits:      (6 visits through 8/3)    G-Code (if applicable):          JUSTICE     Time in:   9:45     Timed Treatment: 40  Total Treatment Time:  40  ________________________________________________________________________________________    Pain Level:    /10  SUBJECTIVE:  Pt reports that she is doing well. Reports that her daily pains have gone down. Notes that she did have some pain and discomfort after playing tennis yesterday.      OBJECTIVE:     Exercise/Equipment Resistance/Repetitions Other comments   TG 5 min    bridge x10    SL hip ER with stabilization Green loop   Prone hip neuro re-ed 3 min    Side-lying hip ER    Quadruped sit back (frog) 5x10\"    3-way ball compression 5x10\"           Standing hip stretch X10 B rotation Pelvis moving on femur   Standing hip stack 2x30\" B    Standing hip abduction X10 B 30#   Hip extension slide out X10 B                                                              Other Therapeutic Activities:      Manual Treatments:    RLE leg

## 2023-06-26 ENCOUNTER — HOSPITAL ENCOUNTER (OUTPATIENT)
Dept: PHYSICAL THERAPY | Age: 68
Setting detail: THERAPIES SERIES
Discharge: HOME | End: 2023-06-26
Payer: MEDICARE

## 2023-06-26 PROCEDURE — 97140 MANUAL THERAPY 1/> REGIONS: CPT

## 2023-06-26 PROCEDURE — 97110 THERAPEUTIC EXERCISES: CPT

## 2023-06-28 ENCOUNTER — HOSPITAL ENCOUNTER (OUTPATIENT)
Dept: PHYSICAL THERAPY | Age: 68
Setting detail: THERAPIES SERIES
Discharge: HOME | End: 2023-06-28
Payer: MEDICARE

## 2023-06-28 PROCEDURE — 97110 THERAPEUTIC EXERCISES: CPT

## 2023-06-28 PROCEDURE — 97140 MANUAL THERAPY 1/> REGIONS: CPT

## 2023-07-14 ENCOUNTER — HOSPITAL ENCOUNTER (OUTPATIENT)
Dept: PHYSICAL THERAPY | Age: 68
Setting detail: THERAPIES SERIES
Discharge: HOME | End: 2023-07-14
Payer: MEDICARE

## 2023-07-14 PROCEDURE — 97140 MANUAL THERAPY 1/> REGIONS: CPT

## 2023-07-14 PROCEDURE — 97110 THERAPEUTIC EXERCISES: CPT

## (undated) DEVICE — GOWN,SIRUS,POLYRNF,SETINSLV,L,20/CS: Brand: MEDLINE

## (undated) DEVICE — MICRO SAGITTAL BLADE, FINE, 5.5 X 18.5 X 0.4 MM

## (undated) DEVICE — PADDING CAST W4INXL4YD NONSTERILE COT RAYON MICROPLEATED

## (undated) DEVICE — SUTURE VCRL SZ 4-0 L18IN ABSRB UD L19MM PS-2 3/8 CIR PRIM J496H

## (undated) DEVICE — PADDING CAST W2INXL4YD COT RAYON BLEND HIGHLY ABSRB

## (undated) DEVICE — SOLUTION IV 1000ML LAC RINGERS PH 6.5 INJ USP VIAFLX PLAS

## (undated) DEVICE — Device

## (undated) DEVICE — MEDI-VAC NON-CONDUCTIVE SUCTION TUBING: Brand: CARDINAL HEALTH

## (undated) DEVICE — BLADE OPHTH 180DEG CUT SURF BLU STR SHRP DBL BVL GRINDLESS

## (undated) DEVICE — COMFO-TEX ELASTIC BANDAGE LATEX FREE, 3INX5YD: Brand: COMFO-TEX™

## (undated) DEVICE — INTENDED FOR TISSUE SEPARATION, AND OTHER PROCEDURES THAT REQUIRE A SHARP SURGICAL BLADE TO PUNCTURE OR CUT.: Brand: BARD-PARKER ® STAINLESS STEEL BLADES

## (undated) DEVICE — CORD ES L12FT BPLR FRCP

## (undated) DEVICE — BLADE OPHTH 60DEG BLU DBL BVL GRINDLESS SHRP 180DEG CUT

## (undated) DEVICE — SUTURE ETHLN SZ 4-0 L18IN NONABSORBABLE BLK L19MM PS-2 3/8 1667H

## (undated) DEVICE — COMFO-TEX ELASTIC BANDAGE LATEX FREE, 2INX5YD: Brand: COMFO-TEX™

## (undated) DEVICE — CATHETER IV 20GA L1.25IN PNK FEP SFTY STR HUB RADPQ DISP

## (undated) DEVICE — ZIMMER® STERILE DISPOSABLE TOURNIQUET CUFF WITH PLC, DUAL PORT, SINGLE BLADDER, 18 IN. (46 CM)

## (undated) DEVICE — SET GRAV VENT NVENT CK VLV 3 NDL FREE PRT 10 GTT

## (undated) DEVICE — SPLINT ORTH W3XL12IN LAYERED FBRGLS FOAM PD BRTH BK MOLD

## (undated) DEVICE — DRAPE C ARM W54XL84IN MINI FOR OEC 6800

## (undated) DEVICE — SET ADMIN PRIMING 7ML L30IN 7.35LB 20 GTT 2ND RLER CLMP

## (undated) DEVICE — 3M™ TEGADERM™ TRANSPARENT FILM DRESSING FRAME STYLE, 1624W, 2-3/8 IN X 2-3/4 IN (6 CM X 7 CM), 100/CT 4CT/CASE: Brand: 3M™ TEGADERM™

## (undated) DEVICE — CHLORAPREP 26ML ORANGE

## (undated) DEVICE — SINGLE USE DEVICE INTENDED TO COVER EXPOSED ENDS OF ORTHOPEDIC PIN AND K-WIRES TO HELP PROTECT THE EXPOSED WIRE FROM SNAGGING ON CLOTHING.: Brand: OXBORO™ PIN COVER

## (undated) DEVICE — SUTURE MERS SZ 4-0 L18IN NONABSORBABLE WHT L13MM P-3 3/8 R691G

## (undated) DEVICE — GLOVE SURG SZ 75 L12IN FNGR THK94MIL STD WHT LTX FREE